# Patient Record
Sex: FEMALE | Race: AMERICAN INDIAN OR ALASKA NATIVE | Employment: UNEMPLOYED | URBAN - METROPOLITAN AREA
[De-identification: names, ages, dates, MRNs, and addresses within clinical notes are randomized per-mention and may not be internally consistent; named-entity substitution may affect disease eponyms.]

---

## 2017-02-22 ENCOUNTER — OFFICE VISIT (OUTPATIENT)
Dept: FAMILY MEDICINE | Facility: CLINIC | Age: 22
End: 2017-02-22
Payer: COMMERCIAL

## 2017-02-22 VITALS
HEART RATE: 65 BPM | BODY MASS INDEX: 43.03 KG/M2 | HEIGHT: 58 IN | SYSTOLIC BLOOD PRESSURE: 108 MMHG | TEMPERATURE: 97.8 F | DIASTOLIC BLOOD PRESSURE: 69 MMHG | WEIGHT: 205 LBS

## 2017-02-22 DIAGNOSIS — Z30.011 ENCOUNTER FOR INITIAL PRESCRIPTION OF CONTRACEPTIVE PILLS: Primary | ICD-10-CM

## 2017-02-22 DIAGNOSIS — Z11.3 SCREEN FOR STD (SEXUALLY TRANSMITTED DISEASE): ICD-10-CM

## 2017-02-22 PROCEDURE — 99213 OFFICE O/P EST LOW 20 MIN: CPT | Performed by: PHYSICIAN ASSISTANT

## 2017-02-22 RX ORDER — LEVONORGESTREL AND ETHINYL ESTRADIOL 0.15-0.03
1 KIT ORAL DAILY
Qty: 84 TABLET | Refills: 3 | Status: SHIPPED | OUTPATIENT
Start: 2017-02-22 | End: 2019-02-17

## 2017-02-22 RX ORDER — LEVONORGESTREL AND ETHINYL ESTRADIOL 0.15-0.03
1 KIT ORAL DAILY
COMMUNITY
End: 2017-02-22

## 2017-02-22 NOTE — MR AVS SNAPSHOT
"              After Visit Summary   2017    Angie Reynolds    MRN: [id number]           Patient Information     Date Of Birth          1995        Visit Information        Provider Department      2017 9:20 AM Cristela Lynn PA-C [facility]        Today's Diagnoses     Encounter for initial prescription of contraceptive pills    -  1    Screen for STD (sexually transmitted disease)           Follow-ups after your visit        Who to contact     If you have questions or need follow up information about today's clinic visit or your schedule please contact [facility] directly at [phone].  Normal or non-critical lab and imaging results will be communicated to you by MyChart, letter or phone within 4 business days after the clinic has received the results. If you do not hear from us within 7 days, please contact the clinic through [facility] or phone. If you have a critical or abnormal lab result, we will notify you by phone as soon as possible.  Submit refill requests through [employer] or call your pharmacy and they will forward the refill request to us. Please allow 3 business days for your refill to be completed.          Additional Information About Your Visit        MyChart Information     [employer] lets you send messages to your doctor, view your test results, renew your prescriptions, schedule appointments and more. To sign up, go to [email or web address] . Click on \"Log in\" on the left side of the screen, which will take you to the Welcome page. Then click on \"Sign up Now\" on the right side of the page.     You will be asked to enter the access code listed below, as well as some personal information. Please follow the directions to create your username and password.     Your access code is: [email or web address]  Expires: 2017 10:02 AM     Your access code will  in 90 days. If you need help or a new code, please call your [place] clinic " "or [phone].        Care EveryWhere ID     This is your Care EveryWhere ID. This could be used by other organizations to access your [place] medical records  [id number]        Your Vitals Were     Pulse Temperature Height Last Period Breastfeeding? BMI (Body Mass Index)    65 97.8  F (36.6  C) (Oral) 4' 10.43\" (1.484 m) 01/30/2017 No 42.22 kg/m2       Blood Pressure from Last 3 Encounters:   02/22/17 108/69   10/21/10 (!) 89/53    Weight from Last 3 Encounters:   02/22/17 205 lb (93 kg) (98 %)*   10/21/10 143 lb (64.9 kg) (92 %)*   09/13/10 146 lb (66.2 kg) (94 %)*     * Growth percentiles are based on [facility] 2-20 Years data.              We Performed the Following     Chlamydia trachomatis PCR     Neisseria gonorrhoeae PCR          Where to get your medicines      These medications were sent to [place] [id number] [facility] [place] - [address]  [address] [place] [address]     Phone:  [phone]     levonorgestrel-ethinyl estradiol 0.15-30 MG-MCG per tablet          Primary Care Provider Office Phone # Fax #    Barrington Sotelo [phone]       [facility] [address]  [place], [place]. \Hospital Has a New Name and Outlook.\"" [id number]        Thank you!     Thank you for choosing [facility]  for your care. Our goal is always to provide you with excellent care. Hearing back from our patients is one way we can continue to improve our services. Please take a few minutes to complete the written survey that you may receive in the mail after your visit with us. Thank you!             Your Updated Medication List - Protect others around you: Learn how to safely use, store and throw away your medicines at www.disposemymeds.org.          This list is accurate as of: 2/22/17 10:02 AM.  Always use your most recent med list.                   Brand Name Dispense Instructions for use    levonorgestrel-ethinyl estradiol 0.15-30 MG-MCG per tablet    AGUSTO    84 tablet    Take 1 tablet by mouth " daily

## 2017-02-22 NOTE — PROGRESS NOTES
"  SUBJECTIVE:                                                    Angie Reynolds is a 21 year old female who presents to clinic today for the following health issues:      Medication Followup of Birth Control     Taking Medication as prescribed: yes    Side Effects:  None    Medication Helping Symptoms:  yes   Was not being seen due to insurance  Started her moms birth control who didn't need it   Likes this.  Had never been on it before.  Wants it due to being SA.  No problems taking it.    No family hx of clotting disorder         Problem list and histories reviewed & adjusted, as indicated.  Additional history: as documented    There is no problem list on file for this patient.    Past Surgical History   Procedure Laterality Date     Pe tubes         Social History   Substance Use Topics     Smoking status: Never Smoker     Smokeless tobacco: Not on file     Alcohol use No     Family History   Problem Relation Age of Onset     HEART DISEASE Maternal Grandfather            ROS:  As above    OBJECTIVE:                                                    /69 (BP Location: Left arm, Patient Position: Chair, Cuff Size: Adult Large)  Pulse 65  Temp 97.8  F (36.6  C) (Oral)  Ht 4' 10.43\" (1.484 m)  Wt 205 lb (93 kg)  LMP 01/30/2017  Breastfeeding? No  BMI 42.22 kg/m2  Body mass index is 42.22 kg/(m^2).  GENERAL: alert, no distress and obese  RESP: lungs clear to auscultation - no rales, rhonchi or wheezes  CV: regular rates and rhythm, normal S1 S2, no S3 or S4 and no murmur, click or rub    Diagnostic Test Results:  none      ASSESSMENT/PLAN:                                                      1. Encounter for initial prescription of contraceptive pills  Continue.  Discussed clotting risk.  Encouraged condom use still  - levonorgestrel-ethinyl estradiol (NORDETTE) 0.15-30 MG-MCG per tablet; Take 1 tablet by mouth daily  Dispense: 84 tablet; Refill: 3  - Chlamydia trachomatis PCR; Future  - Neisseria " gonorrhoeae PCR; Future    2. Screen for STD (sexually transmitted disease)    - Chlamydia trachomatis PCR; Future  - Neisseria gonorrhoeae PCR; Future    FUTURE APPOINTMENTS:       - Follow-up for annual visit or as needed    Cristela Lynn PA-C  [facility]

## 2018-09-03 ENCOUNTER — HEALTH MAINTENANCE LETTER (OUTPATIENT)
Age: 23
End: 2018-09-03

## 2019-01-12 DIAGNOSIS — Z30.011 ENCOUNTER FOR INITIAL PRESCRIPTION OF CONTRACEPTIVE PILLS: ICD-10-CM

## 2019-01-14 RX ORDER — LEVONORGESTREL AND ETHINYL ESTRADIOL 0.15-0.03
KIT ORAL
Qty: 28 TABLET | Refills: 0 | OUTPATIENT
Start: 2019-01-14

## 2019-01-14 NOTE — TELEPHONE ENCOUNTER
Attempt # 1  Called patient at home [phone]  Was call answered?  No answer, left message to call nurse line at [phone]    Brisa Cruz RN  [facility]

## 2019-01-14 NOTE — TELEPHONE ENCOUNTER
"Requested Prescriptions   Pending Prescriptions Disp Refills     MARLISSA 0.15-30 MG-MCG tablet [Pharmacy Med Name: MARLISSA-28 TABLET] 84 tablet 2     Sig: TAKE 1 TABLET BY MOUTH ONCE DAILY.    Contraceptives Protocol Failed - 1/12/2019  1:35 PM       Failed - Recent (12 mo) or future (30 days) visit within the authorizing provider's specialty    Patient had office visit in the last 12 months or has a visit in the next 30 days with authorizing provider or within the authorizing provider's specialty.  See \"Patient Info\" tab in inbasket, or \"Choose Columns\" in Meds & Orders section of the refill encounter.             Passed - Patient is not a current smoker if age is 35 or older       Passed - Medication is active on med list       Passed - No active pregnancy on record       Passed - No positive pregnancy test in past 12 months        Last Rx was sent 2/22/17 for a year, should have run out almost a year ago.    Last visit was 2/22/17.    Routing refill request to provider for review/approval because:  A break in medication  Patient needs to be seen because it has been more than 1 year since last office visit.    Deneen Palm RN  [facility]            "

## 2019-01-16 NOTE — TELEPHONE ENCOUNTER
Attempt # 1  Called patient at home number.  Was call answered?  Yes, when nurse identified self and clinic patient hung up, nurse called right back and it went to voice mail, nurse left message: we received a refill request for birth control and will not be filled without an office visit. May call [phone] to schedule appointment.    Closing TE.     Brisa Cruz RN  [facility]

## 2019-01-30 DIAGNOSIS — Z30.011 ENCOUNTER FOR INITIAL PRESCRIPTION OF CONTRACEPTIVE PILLS: ICD-10-CM

## 2019-01-30 NOTE — TELEPHONE ENCOUNTER
"Requested Prescriptions   Pending Prescriptions Disp Refills     MARLISSA 0.15-30 MG-MCG tablet [Pharmacy Med Name: MARLISSA-28 TABLET] 84 tablet 2    Last Written Prescription Date:  2-22-17  Last Fill Quantity: 84,  # refills: 3   Last office visit: 2/22/2017 with prescribing provider:  2-22-17   Future Office Visit:     Sig: TAKE 1 TABLET BY MOUTH ONCE DAILY.    Contraceptives Protocol Failed - 1/30/2019 12:09 PM       Failed - Recent (12 mo) or future (30 days) visit within the authorizing provider's specialty    Patient had office visit in the last 12 months or has a visit in the next 30 days with authorizing provider or within the authorizing provider's specialty.  See \"Patient Info\" tab in inbasket, or \"Choose Columns\" in Meds & Orders section of the refill encounter.             Passed - Patient is not a current smoker if age is 35 or older       Passed - Medication is active on med list       Passed - No active pregnancy on record       Passed - No positive pregnancy test in past 12 months          "

## 2019-01-31 RX ORDER — LEVONORGESTREL AND ETHINYL ESTRADIOL 0.15-0.03
KIT ORAL
Qty: 84 TABLET | Refills: 2 | OUTPATIENT
Start: 2019-01-31

## 2019-01-31 NOTE — TELEPHONE ENCOUNTER
Patient needs an appointment for refills. Message left for patient see other TE. Note to pharmacy that medication will not be refilled without an appointment.     Laura Aguilar RN

## 2019-02-17 DIAGNOSIS — Z30.011 ENCOUNTER FOR INITIAL PRESCRIPTION OF CONTRACEPTIVE PILLS: ICD-10-CM

## 2019-02-18 RX ORDER — LEVONORGESTREL AND ETHINYL ESTRADIOL 0.15-0.03
KIT ORAL
Qty: 84 TABLET | Refills: 2 | Status: SHIPPED | OUTPATIENT
Start: 2019-02-18 | End: 2021-12-08

## 2019-02-18 NOTE — TELEPHONE ENCOUNTER
"Requested Prescriptions   Pending Prescriptions Disp Refills     MARLISSA 0.15-30 MG-MCG tablet [Pharmacy Med Name: MARLISSA-28 TABLET] 84 tablet 2    Last Written Prescription Date:  2/22/17  Last Fill Quantity: 84,  # refills: 3   Last office visit: 2/22/2017 with prescribing provider:     Future Office Visit:     Sig: TAKE 1 TABLET BY MOUTH ONCE DAILY.    Contraceptives Protocol Failed - 2/17/2019  2:03 PM       Failed - Recent (12 mo) or future (30 days) visit within the authorizing provider's specialty    Patient had office visit in the last 12 months or has a visit in the next 30 days with authorizing provider or within the authorizing provider's specialty.  See \"Patient Info\" tab in inbasket, or \"Choose Columns\" in Meds & Orders section of the refill encounter.             Passed - Patient is not a current smoker if age is 35 or older       Passed - Medication is active on med list       Passed - No active pregnancy on record       Passed - No positive pregnancy test in past 12 months          "

## 2019-02-18 NOTE — TELEPHONE ENCOUNTER
Multiple attempts to reach patient by phone.  Last note states she hung up on caller.    Patient has not been seen since 2/2017.    Should refill request be denied?    Please review.  Thank you.  Anna Marie Leblanc RN

## 2019-02-25 ENCOUNTER — OFFICE VISIT (OUTPATIENT)
Dept: FAMILY MEDICINE | Facility: CLINIC | Age: 24
End: 2019-02-25
Payer: COMMERCIAL

## 2019-02-25 VITALS
TEMPERATURE: 98.5 F | DIASTOLIC BLOOD PRESSURE: 63 MMHG | WEIGHT: 170 LBS | HEIGHT: 59 IN | BODY MASS INDEX: 34.27 KG/M2 | OXYGEN SATURATION: 97 % | SYSTOLIC BLOOD PRESSURE: 102 MMHG | HEART RATE: 56 BPM

## 2019-02-25 DIAGNOSIS — Z48.02 VISIT FOR SUTURE REMOVAL: ICD-10-CM

## 2019-02-25 DIAGNOSIS — S61.311D LACERATION OF LEFT INDEX FINGER WITHOUT FOREIGN BODY WITH DAMAGE TO NAIL, SUBSEQUENT ENCOUNTER: Primary | ICD-10-CM

## 2019-02-25 PROCEDURE — 99203 OFFICE O/P NEW LOW 30 MIN: CPT | Performed by: FAMILY MEDICINE

## 2019-02-25 ASSESSMENT — MIFFLIN-ST. JEOR: SCORE: 1440.73

## 2019-02-25 NOTE — PROGRESS NOTES
"  SUBJECTIVE:   Angie Reynolds is a 23 year old female who presents to clinic today for the following health issues:      ED/UC Followup:    Facility:  [facility]  Date of visit: 2/16/19  Reason for visit: Laceration of left index finger  Current Status: Here for suture removal       She suffered a near avulsion laceration to the tip of her left index finger.  She cut it on a  at work.  She works at a local [employer].  She went into the ER and had 4 sutures placed.  There apparently was no bony injury.  She is here for suture removal now.  She has not been cleaning the wound at all.  She has kept it bandaged.    Problem list and histories reviewed & adjusted, as indicated.  Additional history: as documented    There is no problem list on file for this patient.    Past Surgical History:   Procedure Laterality Date     PE TUBES         Social History     Tobacco Use     Smoking status: Never Smoker     Smokeless tobacco: Never Used   Substance Use Topics     Alcohol use: Yes     Family History   Problem Relation Age of Onset     Heart Disease Maternal Grandfather          Current Outpatient Medications   Medication Sig Dispense Refill     MARLISSA 0.15-30 MG-MCG tablet TAKE 1 TABLET BY MOUTH ONCE DAILY. 84 tablet 2     Allergies   Allergen Reactions     Penicillins Hives       Reviewed and updated as needed this visit by clinical staff  Tobacco  Allergies  Meds  Med Hx  Surg Hx  Fam Hx  Soc Hx      Reviewed and updated as needed this visit by Provider         ROS:  Constitutional, HEENT, cardiovascular, pulmonary, gi and gu systems are negative, except as otherwise noted.    OBJECTIVE:     /63 (BP Location: Right arm, Patient Position: Chair, Cuff Size: Adult Regular)   Pulse 56   Temp 98.5  F (36.9  C) (Oral)   Ht 1.497 m (4' 10.94\")   Wt 77.1 kg (170 lb)   SpO2 97%   BMI 34.41 kg/m    Body mass index is 34.41 kg/m .  GENERAL: alert, no distress and over weight  SKIN: The tip of the left finger " has a healing injury that does not look infected, but she has cotton gauze bandage impregnated into the skin of the tip of her left finger and the skin and bandages very irregular and covered with scab and it is difficult to see the sutures.  I tried picking at the wound initially with a forceps and suture scissors, but it was causing the patient a lot of pain and she was crying.  We then had her soak the finger for a prolonged time and then I proceeded to try to pick out some of the cotton gauze dressing that was sticking to her wound in order to get to the suture material.  This was again very painful for the patient and caused some fresh bleeding.  The sutures were difficult to see.  The tip of her finger nail was avulsed and was sticking to some of the wound and I was able to remove that.  I eventually was able to remove at least 3 sutures.  I continued to lightly and carefully debride the wound and re-pick away at the adherent bandage material.  I was not able to see any further suture material.    The wound was then further cleansed and bandaged.      Diagnostic Test Results:  Her ER note was reviewed    ASSESSMENT/PLAN:       ICD-10-CM    1. Laceration of left index finger without foreign body with damage to nail, subsequent encounter S61.311D    2. Visit for suture removal Z48.02      This was an extremely difficult suture removal and I am still not convinced that I got all the suture removed, but we were reaching the limit of what she could tolerate  She will continue to soak the finger and look at daily for any further suture material  We also will set her up to see one of our female providers later this week for a general physical including Pap smear  I alerted the female provider, Subha Sierra, who the patient will be seeing, of the patient and the request to recheck her fingertip on Friday when she returns for her physical  The patient could return to see me in the meantime for any further concerns about  this injury as needed/desired as well    25 minute visit with over half the time spent in complex wound care and debridement and suture removal      Barrington Sotelo MD  [facility]

## 2019-03-01 ENCOUNTER — OFFICE VISIT (OUTPATIENT)
Dept: FAMILY MEDICINE | Facility: CLINIC | Age: 24
End: 2019-03-01
Payer: MEDICAID

## 2019-03-01 VITALS
OXYGEN SATURATION: 98 % | WEIGHT: 171 LBS | HEART RATE: 56 BPM | SYSTOLIC BLOOD PRESSURE: 103 MMHG | DIASTOLIC BLOOD PRESSURE: 49 MMHG | TEMPERATURE: 97.6 F | BODY MASS INDEX: 34.61 KG/M2

## 2019-03-01 DIAGNOSIS — Z12.4 CERVICAL CANCER SCREENING: ICD-10-CM

## 2019-03-01 DIAGNOSIS — Z23 NEED FOR HPV VACCINE: ICD-10-CM

## 2019-03-01 DIAGNOSIS — Z00.01 ENCOUNTER FOR PREVENTATIVE ADULT HEALTH CARE EXAM WITH ABNORMAL FINDINGS: Primary | ICD-10-CM

## 2019-03-01 DIAGNOSIS — R06.89 DIFFICULTY BREATHING: ICD-10-CM

## 2019-03-01 DIAGNOSIS — Z00.00 HEALTHCARE MAINTENANCE: Primary | ICD-10-CM

## 2019-03-01 DIAGNOSIS — Z00.00 HEALTHCARE MAINTENANCE: ICD-10-CM

## 2019-03-01 DIAGNOSIS — T14.8XXA AVULSION OF SKIN: ICD-10-CM

## 2019-03-01 PROCEDURE — 99213 OFFICE O/P EST LOW 20 MIN: CPT | Mod: 25 | Performed by: NURSE PRACTITIONER

## 2019-03-01 PROCEDURE — 87591 N.GONORRHOEAE DNA AMP PROB: CPT | Performed by: NURSE PRACTITIONER

## 2019-03-01 PROCEDURE — 90471 IMMUNIZATION ADMIN: CPT | Performed by: NURSE PRACTITIONER

## 2019-03-01 PROCEDURE — 99395 PREV VISIT EST AGE 18-39: CPT | Mod: 25 | Performed by: NURSE PRACTITIONER

## 2019-03-01 PROCEDURE — 90649 4VHPV VACCINE 3 DOSE IM: CPT | Performed by: NURSE PRACTITIONER

## 2019-03-01 PROCEDURE — 87491 CHLMYD TRACH DNA AMP PROBE: CPT | Performed by: NURSE PRACTITIONER

## 2019-03-01 PROCEDURE — G0145 SCR C/V CYTO,THINLAYER,RESCR: HCPCS | Performed by: NURSE PRACTITIONER

## 2019-03-01 ASSESSMENT — ENCOUNTER SYMPTOMS
NERVOUS/ANXIOUS: 0
HEADACHES: 0
FEVER: 0
JOINT SWELLING: 0
DYSURIA: 0
PARESTHESIAS: 0
SORE THROAT: 0
FREQUENCY: 0
MYALGIAS: 0
SHORTNESS OF BREATH: 0
CHILLS: 0
COUGH: 0
EYE PAIN: 0
BREAST MASS: 0
ARTHRALGIAS: 0
HEMATURIA: 0
WEAKNESS: 0
NAUSEA: 0
HEARTBURN: 1
ABDOMINAL PAIN: 0
PALPITATIONS: 0
HEMATOCHEZIA: 0
DIZZINESS: 0
DIARRHEA: 0
CONSTIPATION: 0

## 2019-03-01 ASSESSMENT — PAIN SCALES - GENERAL: PAINLEVEL: NO PAIN (0)

## 2019-03-01 NOTE — LETTER
[address] [address]  Phone: [phone]  Fax: [phone]    March 1, 2019        Angie Reynolds  [address] [place] [address]          To whom it may concern:    RE: Angie Reynolds    Patient was seen on 2/25/19 for suture removal.    Please contact me for questions or concerns.      Sincerely,        CARA Acosta CNP

## 2019-03-01 NOTE — LETTER
March 11, 2019      Angie M Rodolfo  [address] [place] [id number]    Dear MsKhangRodolfo,      I am happy to inform you that your recent cervical cancer screening test (PAP smear) was normal.      Preventative screenings such as this help to ensure your health for years to come. You should repeat a pap smear in 3 years, unless otherwise directed.      You will still need to return to the clinic every year for your annual exam and other preventive tests.     If you have additional questions regarding this result, please call our registered nurse, Mónica at [phone].      Sincerely,      CARA Acosta CNP/rlm

## 2019-03-01 NOTE — NURSING NOTE
Screening Questionnaire for Adult Immunization    1. Are you sick today? No  2. Do  you have allergies to medications, food, a vaccine component, or latex? No   3. Have you had a serious reaction to a vaccine in the past? No   4. Do you have a long-term  health problem with lung, heart, kidney or metabolic disease(e.g., diabetes), asthma, or a blood disorder?  No  5.Do you have cancer,leukemia,HIV/AIDS, or any other immune system problem ? No  6. In the past 3 months, have you taken medications that weaken your immune sytem,      Such as cortisone, prednisone, other steroids, or anticancer drugs, or have you had radiation treatments ?No  7. Have you had a seizure or brain or other nervous system problems? No  8. During the past year, have you received a transfusion of blood or blood products, or been given immune      (gamma) globulin or an antiviral drug? No  9. For women: Are you pregnant or is there a chance you could become pregnant during the next month?No   10. Have you received any vaccinations in the past 4 weeks ? No    Immunization questionnaire answers were all negative.     Screening performed by patient/cm  VIS for HPV given on same date of administration.  Staff signature/Title: BONG Mercado MA  Prior to injection, verified patient identity using patient's name and date of birth.  Due to injection administration, patient instructed to remain in clinic for 15 minutes  afterwards, and to report any adverse reaction to me immediately.    HPV    Drug Amount Wasted:  None.  Vial/Syringe: Single dose vial  Expiration Date:  10/10/20

## 2019-03-01 NOTE — LETTER
[facility]   [address] [place]  [address]  [phone]                                   March 4, 2019    Angie Reynolds  [address]  [facility] [address]        Dear Angie,    The results of your recent labs are enclosed.   Testing for gonorrhea and chlamydia was negative.   The blood work was not done. Please schedule a lab only appointment some time in the next few weeks for labs.   It was nice meeting you in clinic. Please let me know if you have any concerns.     Results for orders placed or performed in visit on 03/01/19   NEISSERIA GONORRHOEA PCR   Result Value Ref Range    Specimen Descrip Cervical     N Gonorrhea PCR Negative NEG^Negative   CHLAMYDIA TRACHOMATIS PCR   Result Value Ref Range    Specimen Description Cervical     Chlamydia Trachomatis PCR Negative NEG^Negative       If you have any questions please call the clinic at [phone]    Sincerely,    Subha Sierra CNP  bmd

## 2019-03-01 NOTE — PROGRESS NOTES
SUBJECTIVE:   CC: Angie Reynolds is an 23 year old woman who presents for preventive health visit.     Physical   Annual:     Getting at least 3 servings of Calcium per day:  Yes    Bi-annual eye exam:  Yes    Dental care twice a year:  Yes    Sleep apnea or symptoms of sleep apnea:  None    Diet:  Regular (no restrictions)    Frequency of exercise:  1 day/week    Duration of exercise:  Less than 15 minutes    Taking medications regularly:  Yes    Medication side effects:  None    Additional concerns today:  No    PHQ-2 Total Score: 0    She is on COCs  2 partners in past year  Agreeable to STD testing  Regular periods  Smokes 2-3 cigarettes/day  Started recently  Work is stressful  Works at [employer]    She saw Dr. Sotelo in clinic 4 days ago for f/u of finger tip avulsion. The dressing was adhered to the wound and suture removal was difficult.   Since then she has been cleansing daily, applying topical antibiotic and keep covered  No drainage, increase in pain, fever or chills    She asks for referral to ENT  Difficulty breathing through her nose for years  Constant  Mouth breather  Does not snore  No history of injury  No nasal drainage or signs of infection    Today's PHQ-2 Score:   PHQ-2 ( 1999 [employer]) 3/1/2019   Q1: Little interest or pleasure in doing things 0   Q2: Feeling down, depressed or hopeless 0   PHQ-2 Score 0   Q1: Little interest or pleasure in doing things Not at all   Q2: Feeling down, depressed or hopeless Not at all   PHQ-2 Score 0       Abuse: Current or Past(Physical, Sexual or Emotional)- No  Do you feel safe in your environment? Yes    Social History     Tobacco Use     Smoking status: Never Smoker     Smokeless tobacco: Never Used   Substance Use Topics     Alcohol use: Yes     Alcohol Use 3/1/2019   If you drink alcohol do you typically have greater than 3 drinks per day OR greater than 7 drinks per week? No       Reviewed orders with patient.  Reviewed health maintenance and  updated orders accordingly - Yes  Labs reviewed in EPIC  BP Readings from Last 3 Encounters:   03/01/19 103/49   02/25/19 102/63   02/22/17 108/69    Wt Readings from Last 3 Encounters:   03/01/19 77.6 kg (171 lb)   02/25/19 77.1 kg (170 lb)   02/22/17 93 kg (205 lb) (98 %)*     * Growth percentiles are based on CDC (Girls, 2-20 Years) data.                  There is no problem list on file for this patient.    Past Surgical History:   Procedure Laterality Date     PE TUBES         Social History     Tobacco Use     Smoking status: Never Smoker     Smokeless tobacco: Never Used   Substance Use Topics     Alcohol use: Yes     Family History   Problem Relation Age of Onset     Asthma Mother      Heart Disease Maternal Grandfather      Asthma Brother      Depression Sister         post partum     Depression Sister         post partum         Current Outpatient Medications   Medication Sig Dispense Refill     MARLISSA 0.15-30 MG-MCG tablet TAKE 1 TABLET BY MOUTH ONCE DAILY. 84 tablet 2       Mammogram not appropriate for this patient based on age.    Pertinent mammograms are reviewed under the imaging tab.  History of abnormal Pap smear: NO - age 21-29 PAP every 3 years recommended     Reviewed and updated as needed this visit by clinical staff         Reviewed and updated as needed this visit by Provider        No past medical history on file.     Review of Systems   Constitutional: Negative for chills and fever.   HENT: Negative for congestion, ear pain, hearing loss and sore throat.    Eyes: Negative for pain and visual disturbance.   Respiratory: Negative for cough and shortness of breath.    Cardiovascular: Negative for palpitations and peripheral edema.   Gastrointestinal: Negative for abdominal pain, constipation, diarrhea, hematochezia and nausea.   Breasts:  Negative for tenderness, breast mass and discharge.   Genitourinary: Positive for vaginal discharge. Negative for dysuria, frequency, genital sores,  hematuria, pelvic pain, urgency and vaginal bleeding.   Musculoskeletal: Negative for arthralgias, joint swelling and myalgias.   Skin: Negative for rash.   Neurological: Negative for dizziness, weakness, headaches and paresthesias.   Psychiatric/Behavioral: Negative for mood changes. The patient is not nervous/anxious.           OBJECTIVE:   /49 (BP Location: Right arm, Patient Position: Chair, Cuff Size: Adult Regular)   Pulse 56   Temp 97.6  F (36.4  C) (Oral)   Wt 77.6 kg (171 lb)   LMP 02/20/2019   SpO2 98%   Breastfeeding? No   BMI 34.61 kg/m    Physical Exam  GENERAL: healthy, alert and no distress  EYES: Eyes grossly normal to inspection, PERRL and conjunctivae and sclerae normal  HENT: ear canals and TM's normal, nose and mouth without ulcers or lesions  NECK: no adenopathy, no asymmetry, masses, or scars and thyroid normal to palpation  RESP: lungs clear to auscultation - no rales, rhonchi or wheezes  CV: regular rate and rhythm, normal S1 S2, no S3 or S4, no murmur, click or rub, no peripheral edema and peripheral pulses strong  ABDOMEN: soft, nontender, no hepatosplenomegaly, no masses and bowel sounds normal   (female): normal female external genitalia, normal urethral meatus, vaginal mucosa pink, moist, well rugated, and normal cervix  Pelvic exam done with Yasmeen Mercado MA present  MS: no gross musculoskeletal defects noted, no edema  SKIN: partial thickness avulsion to left index finger. I do not see any sutures. No surrounding erythema or drainage.   NEURO: Normal strength and tone, mentation intact and speech normal  PSYCH: mentation appears normal, affect normal/bright    Diagnostic Test Results:  none     ASSESSMENT/PLAN:       ICD-10-CM    1. Encounter for preventative adult health care exam with abnormal findings Z00.01    2. Difficulty breathing R06.89 OTOLARYNGOLOGY REFERRAL   3. Healthcare maintenance Z00.00 NEISSERIA GONORRHOEA PCR     CHLAMYDIA TRACHOMATIS PCR      "CANCELED: CBC with platelets     CANCELED: Basic metabolic panel     CANCELED: TSH with free T4 reflex     CANCELED: HIV Antigen Antibody Combo     CANCELED: Treponema Abs w Reflex to RPR and Titer   4. Cervical cancer screening Z12.4 Pap imaged thin layer screen only - recommended age 21 - 24 years   5. Need for HPV vaccine Z23 VACCINE ADMINISTRATION, INITIAL   6. Avulsion of skin T14.8XXA      Strongly encouraged smoking cessation. She is only smoking a few cigarettes daily and advised that now will be the easiest time in her life to quit  Reviewed risks of tobacco use  STD screening and baseline labs  Reviewed indication for HPV vaccine which she is agreeable to  Skin injury appears to be healing. No signs of infection. Reviewed continued wound cares and S/S to watch for and when to return to clinic  Discussed risks of COCs including blood clot and increased risk with smoking    COUNSELING:  Reviewed preventive health counseling, as reflected in patient instructions       Regular exercise       Healthy diet/nutrition       Contraception       Safe sex practices/STD prevention    BP Readings from Last 1 Encounters:   02/25/19 102/63     Estimated body mass index is 34.41 kg/m  as calculated from the following:    Height as of 2/25/19: 1.497 m (4' 10.94\").    Weight as of 2/25/19: 77.1 kg (170 lb).           reports that  has never smoked. she has never used smokeless tobacco.      Counseling Resources:  ATP IV Guidelines  Pooled Cohorts Equation Calculator  Breast Cancer Risk Calculator  FRAX Risk Assessment  ICSI Preventive Guidelines  Dietary Guidelines for Americans, 2010  USDA's MyPlate  ASA Prophylaxis  Lung CA Screening    CARA Acosta CNP  [facility]  "

## 2019-03-03 LAB
C TRACH DNA SPEC QL NAA+PROBE: NEGATIVE
N GONORRHOEA DNA SPEC QL NAA+PROBE: NEGATIVE
SPECIMEN SOURCE: NORMAL
SPECIMEN SOURCE: NORMAL

## 2019-03-04 NOTE — RESULT ENCOUNTER NOTE
[address] [address]  Phone: [phone]  Fax: [phone]      03/04/19    Angie Reynolds  [address] [place] [id number]      Dear Angie,    The results of your recent labs are enclosed.  Testing for gonorrhea and chlamydia was negative.  The blood work was not done. Please schedule a lab only appointment some time in the next few weeks for labs.   It was nice meeting you in clinic. Please let me know if you have any concerns.     Sincerely,    CARA Acosta CNP    Your [facility] Care Team

## 2019-03-06 LAB
COPATH REPORT: NORMAL
PAP: NORMAL

## 2019-09-23 ENCOUNTER — OFFICE VISIT (OUTPATIENT)
Dept: FAMILY MEDICINE | Facility: CLINIC | Age: 24
End: 2019-09-23
Payer: COMMERCIAL

## 2019-09-23 VITALS
HEART RATE: 76 BPM | BODY MASS INDEX: 35.42 KG/M2 | TEMPERATURE: 98.4 F | SYSTOLIC BLOOD PRESSURE: 99 MMHG | DIASTOLIC BLOOD PRESSURE: 62 MMHG | WEIGHT: 175 LBS | OXYGEN SATURATION: 98 %

## 2019-09-23 DIAGNOSIS — J06.9 UPPER RESPIRATORY TRACT INFECTION, UNSPECIFIED TYPE: Primary | ICD-10-CM

## 2019-09-23 PROCEDURE — 99213 OFFICE O/P EST LOW 20 MIN: CPT | Performed by: NURSE PRACTITIONER

## 2019-09-23 RX ORDER — FLUTICASONE PROPIONATE 50 MCG
2 SPRAY, SUSPENSION (ML) NASAL DAILY
Qty: 16 G | Refills: 1 | Status: SHIPPED | OUTPATIENT
Start: 2019-09-23 | End: 2019-10-03

## 2019-09-23 ASSESSMENT — PAIN SCALES - GENERAL: PAINLEVEL: NO PAIN (0)

## 2019-09-23 NOTE — PATIENT INSTRUCTIONS
Use 2 sprays of Flonase in each nostril until your symptoms resolve  Make sure you are staying well hydrated  Continue using your over the counter medications and continue with steam bath  If your symptoms worsen, or do not continue to show improvement every day, please call and let me know    Patient Education     Viral Upper Respiratory Illness (Adult)    You have a viral upper respiratory illness (URI), which is another term for the common cold. This illness is contagious during the first few days. It is spread through the air by coughing and sneezing. It may also be spread by direct contact (touching the sick person and then touching your own eyes, nose, or mouth). Frequent handwashing will decrease risk of spread. Most viral illnesses go away within 7 to 10 days with rest and simple home remedies. Sometimes the illness may last for several weeks. Antibiotics will not kill a virus, and they are generally not prescribed for this condition.  Home care    If symptoms are severe, rest at home for the first 2 to 3 days. When you resume activity, don't let yourself get too tired.    Don't smoke. If you need help stopping, talk with your healthcare provider.    Avoid being exposed to cigarette smoke (yours or others ).    You may use acetaminophen or ibuprofen to control pain and fever, unless another medicine was prescribed. If you have chronic liver or kidney disease, have ever had a stomach ulcer or gastrointestinal bleeding, or are taking blood-thinning medicines, talk with your healthcare provider before using these medicines. Aspirin should never be given to anyone under 18 years of age who is ill with a viral infection or fever. It may cause severe liver or brain damage.    Your appetite may be poor, so a light diet is fine. Stay well hydrated by drinking 6 to 8 glasses of fluids per day (water, soft drinks, juices, tea, or soup). Extra fluids will help loosen secretions in the nose and  lungs.    Over-the-counter cold medicines will not shorten the length of time you re sick, but they may be helpful for the following symptoms: cough, sore throat, and nasal and sinus congestion. If you take prescription medicines, ask your healthcare provider or pharmacist which over-the-counter medicines are safe to use. (Note: Don't use decongestants if you have high blood pressure.)  Follow-up care  Follow up with your healthcare provider, or as advised.  When to seek medical advice  Call your healthcare provider right away if any of these occur:    Cough with lots of colored sputum (mucus)    Severe headache; face, neck, or ear pain    Difficulty swallowing due to throat pain    Fever of 100.4 F (38 C) or higher, or as directed by your healthcare provider  Call 911  Call 911 if any of these occur:    Chest pain, shortness of breath, wheezing, or difficulty breathing    Coughing up blood    Very severe pain with swallowing, especially if it goes along with a muffled voice   Date Last Reviewed: 6/1/2018 2000-2018 [employer]. [address] [place] [id number]. All rights reserved. This information is not intended as a substitute for professional medical care. Always follow your healthcare professional's instructions.

## 2019-09-23 NOTE — PROGRESS NOTES
Subjective     Angie Reynolds is a 24 year old female who presents to clinic today for the following health issues:    HPI   Acute Illness   Acute illness concerns: URI  Onset: 1 1/2 weeks    Fever: no    Chills/Sweats: no    Headache (location?): YES- the first couple of days    Sinus Pressure:YES- post-nasal drainage and facial pain    Conjunctivitis:  no    Ear Pain: no    Rhinorrhea: YES    Congestion: YES- nasal and chest    Sore Throat: no     Cough: YES-productive of green sputum    Wheeze: no    Decreased Appetite: no    Nausea: no    Vomiting: no    Diarrhea:  no    Dysuria/Freq.: no    Fatigue/Achiness: YES    Sick/Strep Exposure: no     Therapies Tried and outcome: OTC cold meds, steaming, smudging( burning edmundo leaves) and vitamin c not helping.    She developed a cold approximately 10 days ago but it has not resolved  Cough mostly productive of clear sputums  Sometimes, it is a little yellow  Denies wheeze or SOB  She is sleeping well  Throat feels scratchy, but she is tolerating PO intake        There is no problem list on file for this patient.    Past Surgical History:   Procedure Laterality Date     PE TUBES         Social History     Tobacco Use     Smoking status: Never Smoker     Smokeless tobacco: Never Used   Substance Use Topics     Alcohol use: Yes     Family History   Problem Relation Age of Onset     Asthma Mother      Heart Disease Maternal Grandfather      Asthma Brother      Depression Sister         post partum     Depression Sister         post partum             Reviewed and updated as needed this visit by Provider         Review of Systems   ROS COMP: Constitutional, HEENT, cardiovascular, pulmonary, gi and gu systems are negative, except as otherwise noted.      Objective    BP 99/62 (BP Location: Right arm, Patient Position: Chair, Cuff Size: Adult Regular)   Pulse 76   Temp 98.4  F (36.9  C) (Oral)   Wt 79.4 kg (175 lb)   LMP 08/26/2019   SpO2 98%   Breastfeeding? No    BMI 35.42 kg/m    Body mass index is 35.42 kg/m .  Physical Exam   GENERAL: healthy, alert and no distress  HENT: normal cephalic/atraumatic, ear canals and TM's normal, nose and mouth without ulcers or lesions, nasal mucosa edematous , rhinorrhea clear, oropharynx clear, oral mucous membranes moist and sinuses: not tender  NECK: no adenopathy, no asymmetry, masses, or scars and thyroid normal to palpation  RESP: lungs clear to auscultation - no rales, rhonchi or wheezes  CV: regular rate and rhythm, normal S1 S2, no S3 or S4, no murmur, click or rub, no peripheral edema and peripheral pulses strong    Diagnostic Test Results:  Labs reviewed in Epic        Assessment & Plan       ICD-10-CM    1. Upper respiratory tract infection, unspecified type J06.9 fluticasone (FLONASE) 50 MCG/ACT nasal spray          Discussed likely viral cause of illness and that antibiotics are not warranted.  Reviewed recommendations for symptomatic care: rest, hydration, steam inhalation, over the counter medications as needed to treat symptoms.  If symptoms worsen or do not continue to show daily improvement, she will call into clinic and we can discuss treating with antibiotic    CARA Acosta [facility]

## 2019-10-03 ENCOUNTER — OFFICE VISIT (OUTPATIENT)
Dept: FAMILY MEDICINE | Facility: CLINIC | Age: 24
End: 2019-10-03
Payer: COMMERCIAL

## 2019-10-03 VITALS
BODY MASS INDEX: 36.6 KG/M2 | DIASTOLIC BLOOD PRESSURE: 68 MMHG | SYSTOLIC BLOOD PRESSURE: 112 MMHG | WEIGHT: 180.8 LBS | HEART RATE: 75 BPM | TEMPERATURE: 97.5 F

## 2019-10-03 DIAGNOSIS — L70.0 ACNE VULGARIS: Primary | ICD-10-CM

## 2019-10-03 PROCEDURE — 99213 OFFICE O/P EST LOW 20 MIN: CPT | Performed by: FAMILY MEDICINE

## 2019-10-03 RX ORDER — TRETINOIN 0.25 MG/G
CREAM TOPICAL AT BEDTIME
Qty: 45 G | Refills: 3 | Status: SHIPPED | OUTPATIENT
Start: 2019-10-03 | End: 2021-12-08

## 2019-10-03 NOTE — PROGRESS NOTES
Subjective     Angie Reynolds is a 24 year old female who presents to clinic today for the following health issues:    HPI      Acne on Face - Has gotten worse in the last 1-1.5 weeks     Reviewed and updated as needed this visit by Provider      multiple closed comedoes   No scarring   No acne cysts   Patient was inquiring about an oral antibiotic   Patient uses a facial wash now and now other topical meds for her acne     Current Outpatient Medications   Medication Sig Dispense Refill             MARLISSA 0.15-30 MG-MCG tablet TAKE 1 TABLET BY MOUTH ONCE DAILY. (Patient not taking: Reported on 9/23/2019) 84 tablet 2         O: /68 (BP Location: Right arm, Patient Position: Sitting, Cuff Size: Adult Large)   Pulse 75   Temp 97.5  F (36.4  C) (Oral)   Wt 82 kg (180 lb 12.8 oz)   Breastfeeding? No   BMI 36.60 kg/m      Patient is wearing make up   I can feel the closed comedoes below the surface   There is no signifcant redness or tenderness and no cysts present   It is limited to cheek areas     No adenopathy   No involvement of chest, back   Per patient a little bit in the upper arms at times       ICD-10-CM    1. Acne vulgaris L70.0 tretinoin (RETIN-A) 0.025 % external cream     Recheck 2 months if needed   If doing well, refill prescription

## 2019-10-03 NOTE — PATIENT INSTRUCTIONS
Patient Education     Controlling Adult or Adolescent Acne    You stand the best chance of controlling your acne if you follow your treatment plan. Be patient. Acne often takes months to improve, not days or weeks. Ask your healthcare provider when you can expect your skin to look better. If you don t see results by your goal date, call your healthcare provider. He or she may want to give you some other type of treatment.  Using skin care products and cosmetics  Besides following your treatment plan, take care in choosing skin care products and cosmetics. The following tips may help:    Choose gentle, oil-free soaps and facial cleansers.    Avoid harsh acne scrubs, cleansers, or astringents. These types of products can irritate your skin.    Ask your healthcare provider before buying over-the-counter acne treatments. Some of these, such as those with benzoyl peroxide or salicylic acid, can work. But they often have side effects, such as skin getting too dry with treatments that are too strong.    Read labels on makeup and moisturizers. Choose those that are water based and oil free. Look for the term noncomedogenic. It means that the product won t clog your pores.  Caring for your skin  The right skin care routine can help keep your skin healthy. To take good care of your skin, try these tips:    Gently wash your face or other affected skin twice a day with a mild cleanser. Using your fingertips, smooth the cleanser over your skin. Rinse your skin well. Pat it dry.    If your healthcare provider has approved any over-the-counter acne medicine, use as directed. Use it after you wash your skin. Apply the medicine to all areas where you get acne. Don t just treat acne you can see now. New blemishes may be in progress but not in view yet. Treat all the skin.    Don t squeeze or pick blemishes. Acne blemishes may heal on their own. But squeezing can cause scarring. Scars will remain after acne blemishes  heal.    Sponges, brushes, or other abrasive tools can irritate the skin. Avoid using them.    If you use soft sponges or cloths to apply your makeup, keep them clean.    Try not to touch your face.    If possible, avoid clothing and equipment that blocks or rubs the face.  Getting good results  Learning more about acne is the first step toward controlling this condition. Know that acne that s being treated often gets worse at first before it improves. But with proper treatment and skin care, you can manage your acne and feel better about your skin.   Date Last Reviewed: 2/1/2017 2000-2018 [employer] [address] [place] [address]. All rights reserved. This information is not intended as a substitute for professional medical care. Always follow your healthcare professional's instructions.           Patient Education     Patient Education    Aloe Vera/Aloe Barbadensis Derivatives, Beeswax, Benzyl Alcohol, Bisabolol, Butylene Glycol, Carbomers, cetearyl alcohol, Dimethicone, Distilled Water, Glycerin, Green Tea, Hydroxyethyl Cellulose, Imidazolidinyl urea , Laureth, Methylparaben, Polyethylene Glycol, Polysorbate 60, PPG-2, Propylparaben, Squalane, tetrasodium EDTA, Triethanolamine Topical solution, Citric Acid, Co-Enzyme Q-10 , Cocamidopropyl Betaine, Cocoyl monoethanolamine, Disodium cocoamphodiacetate, Distilled Water, Green Tea, Methylparaben, Panthenol, Phenoxyethanol, Polyethylene Glycol, polyquatemium, Propylparaben, Sodium Chloride, Sodium laureth sulfate, tetrasodium EDTA Topical suspension, cleanser, Tretinoin Topical gel    Tretinoin Oral capsule    Tretinoin Topical cream    Tretinoin Topical cream [Cosmetic Use]    Tretinoin Topical gel    Tretinoin Topical solution  Tretinoin Topical cream  What is this medicine?  TRETINOIN (TRET i lorri in) is a naturally occurring form of vitamin A. It is used on the skin to treat mild to moderate acne.  This medicine may be used for other  purposes; ask your health care provider or pharmacist if you have questions.  What should I tell my health care provider before I take this medicine?  They need to know if you have any of these conditions:    eczema    excessive sensitivity to the sun    sunburn    an unusual or allergic reaction to tretinoin, vitamin A, other medicines, foods, dyes, or preservatives    pregnant or trying to get pregnant    breast-feeding  How should I use this medicine?  This medicine is for external use only. Do not take by mouth. Follow the directions on the prescription label. Gently wash your face with a mild, non-medicated soap before use. Pat the skin dry. Wait 20 to 30 minutes for your skin to dry before use in order to minimize the possibility of skin irritation. Apply enough medicine to cover the affected area and rub in gently. Avoid applying this medicine to your eyes, ears, nostrils, angles of the nose, and mouth. Do not use more often than your doctor or health care professional has recommended. Using too much of this medicine may irritate or increase the irritation of your skin, and will not give faster or better results.  Talk to your pediatrician regarding the use of this medicine in children. While this drug may be prescribed for children as young as 12 years of age for selected conditions, precautions do apply.  Overdosage: If you think you have taken too much of this medicine contact a poison control center or emergency room at once.  NOTE: This medicine is only for you. Do not share this medicine with others.  What if I miss a dose?  If you miss a dose, skip that dose and continue with your regular schedule. Do not use extra doses, or use for a longer period of time than directed by your doctor or health care professional.  What may interact with this medicine?    medicines or other preparations that may dry your skin such as benzoyl peroxide or salicylic acid    medicines that increase your sensitivity to  sunlight such as tetracycline or sulfa drugs  This list may not describe all possible interactions. Give your health care provider a list of all the medicines, herbs, non-prescription drugs, or dietary supplements you use. Also tell them if you smoke, drink alcohol, or use illegal drugs. Some items may interact with your medicine.  What should I watch for while using this medicine?  Your acne may get worse initially and should then start to improve. It may take 2 to 12 weeks before you see the full effect.  Do not wash your face more than 2 or 3 times a day, unless directed by your doctor or health care professional. Do not use the following products on the same areas that you are treating with this medicine, unless otherwise directed by your doctor or health care professional: other topical agents with a strong skin drying effect such as products with a high alcohol content, astringents, spices, the peel of lime or other citrus, medicated soaps or shampoos, permanent wave solutions, electrolysis, hair removers or waxes, or any other preparations or processes that might dry or irritate your skin.  This medicine can make you more sensitive to the sun. Keep out of the sun. If you cannot avoid being in the sun, wear protective clothing and use sunscreen. Do not use sun lamps or tanning beds/booths. Avoid cold weather and wind as much as possible, and use clothing to protect you from the weather. Skin treated with this medicine may dry out or get wind burned more easily.  What side effects may I notice from receiving this medicine?  Side effects that you should report to your doctor or health care professional as soon as possible:    darkening or lightening of the treated areas    severe burning, itching, crusting, or swelling of the treated areas  Side effects that usually do not require medical attention (report to your doctor or health care professional if they continue or are bothersome):    increased sensitivity to  the sun    itching    mild stinging    red, inflamed, and irritated skin, the skin may peel after a few days  This list may not describe all possible side effects. Call your doctor for medical advice about side effects. You may report side effects to FDA at 2-852-FDA-8303.  Where should I keep my medicine?  Keep out of the reach of children.  Store below 27 degrees C (80 degrees F). Do not freeze. Protect from light. Throw away any unused medicine after the expiration date.  NOTE:This sheet is a summary. It may not cover all possible information. If you have questions about this medicine, talk to your doctor, pharmacist, or health care provider. Copyright  2016 [employer]

## 2019-11-06 ENCOUNTER — OFFICE VISIT (OUTPATIENT)
Dept: FAMILY MEDICINE | Facility: CLINIC | Age: 24
End: 2019-11-06
Payer: COMMERCIAL

## 2019-11-06 VITALS
HEART RATE: 60 BPM | HEIGHT: 59 IN | SYSTOLIC BLOOD PRESSURE: 104 MMHG | DIASTOLIC BLOOD PRESSURE: 69 MMHG | WEIGHT: 183.6 LBS | TEMPERATURE: 97.5 F | BODY MASS INDEX: 37.01 KG/M2 | OXYGEN SATURATION: 97 %

## 2019-11-06 DIAGNOSIS — K59.00 CONSTIPATION, UNSPECIFIED CONSTIPATION TYPE: Primary | ICD-10-CM

## 2019-11-06 DIAGNOSIS — K64.4 EXTERNAL HEMORRHOIDS: ICD-10-CM

## 2019-11-06 PROCEDURE — 99213 OFFICE O/P EST LOW 20 MIN: CPT | Performed by: PHYSICIAN ASSISTANT

## 2019-11-06 RX ORDER — POLYETHYLENE GLYCOL 3350 17 G/17G
1 POWDER, FOR SOLUTION ORAL DAILY
Qty: 500 G | Refills: 5 | Status: SHIPPED | OUTPATIENT
Start: 2019-11-06 | End: 2022-03-16

## 2019-11-06 ASSESSMENT — MIFFLIN-ST. JEOR: SCORE: 1497.42

## 2019-11-06 NOTE — PATIENT INSTRUCTIONS
Patient Education     Eating a High-Fiber Diet  Fiber is what gives strength and structure to plants. Most grains, beans, vegetables, and fruits contain fiber. Foods rich in fiber are often low in calories and fat, and they fill you up more. They may also reduce your risks for certain health problems. To find out the amount of fiber in canned, packaged, or frozen foods, read the Nutrition Facts label. It tells you how much fiber is in one serving.    Types of fiber and their benefits  There are two types of fiber: insoluble and soluble. They both aid digestion and help you maintain a healthy weight.    Insoluble fiber. This is found in whole grains, cereals, certain fruits and vegetables such as apple skin, corn, and carrots. Insoluble fiber may prevent constipation and reduce the risk for certain types of cancer. It is called insoluble because it does not dissolve in water.    Soluble fiber. This type of fiber is in oats, beans, and certain fruits and vegetables such as strawberries and peas. Soluble fiber can reduce cholesterol, which may help lower the risk for heart disease. It also helps control blood sugar levels.  Look for high-fiber foods  Try these foods to add fiber to your diet:    Whole-grain breads and cereals. Try to eat 6 to 8 ounces a day. Include wheat and oat bran cereals, whole-wheat muffins or toast, and corn tortillas in your meals.    Fruits. Try to eat 2 cups a day. Apples, oranges, strawberries, pears, and bananas are good sources. (Note: Fruit juice is low in fiber.)    Vegetables. Try to eat at least 2.5 cups a day. Add asparagus, carrots, broccoli, peas, and corn to your meals.    Beans. One cup of cooked lentils gives you over 15 grams of fiber. Try navy beans, lentils, and chickpeas.    Seeds. A small handful of seeds gives you about 3 grams of fiber. Try sunflower or celena seeds.  Keep track of your fiber  Keep track of how much fiber you eat. Start by reading food labels. Then eat a  variety of foods high in fiber. As you start to eat more fiber, ask your healthcare provider how much water you should be drinking to keep your digestive system working smoothly.  Aim for a certain amount of fiber in your diet each day. If you are a woman, that amount is between 25 and 28 grams per day. Men should aim for 30 to 33 grams per day. After age 50, your daily fiber needs drop to 22 grams for women and 28 grams for men.  Before you reach for the fiber supplements, think about this. Fiber is found naturally in healthy whole foods. It gives you that feeling of fullness after you eat. Taking fiber supplements or eating fiber-enriched foods will not give you this full feeling.  Your fiber intake is a good measure for the quality of your overall diet. If you are missing out on your daily amount of fiber, you may be lacking other important nutrients as well.  Date Last Reviewed: 7/1/2017 2000-2018 [employer]. [address] [place] [address]. All rights reserved. This information is not intended as a substitute for professional medical care. Always follow your healthcare professional's instructions.           Patient Education     Treating Constipation    Constipation is a common and often uncomfortable problem. Constipation means you have bowel movements fewer than 3 times per week, or strain to pass hard, dry stool. It can last a short time. Or it can be a problem that never seems to go away. The good news is that it can often be treated and controlled.  Eat more fiber  One of the best ways to help treat constipation is to increase your fiber intake. You can do this either through diet or by using fiber supplements. Fiber (in whole grains, fruits, and vegetables) adds bulk and absorbs water to soften the stool. This helps the stool pass through the colon more easily. When you increase your fiber intake, do it slowly to avoid side effects such as bloating. Also increase the amount of  water that you drink. Eating more of the following foods can add fiber to your diet.    High-fiber cereals    Whole grains, bran, and brown rice    Vegetables such as carrots, broccoli, and greens    Fresh fruits (especially apples, pears, and dried fruits like raisins and apricots)    Nuts and legumes (especially beans such as lentils, kidney beans, and lima beans)  Get physically active  Exercise helps improve the working of your colon which helps ease constipation. Try to get some physical activity every day. If you haven t been active for a while, talk to your healthcare provider before starting again.  Laxatives  Your healthcare provider may suggest an over-the-counter product to help ease your constipation. He or she may suggest the use of bulk-forming agents or laxatives. The use of laxatives, if used as directed, is common and safe. Follow directions carefully when using them. See your healthcare provider for new-onset constipation, or long-term constipation, to rule out other causes such as medicines or thyroid disease.  Date Last Reviewed: 7/1/2016 2000-2018 [employer]. [address] [place] [address], [place] [address]. All rights reserved. This information is not intended as a substitute for professional medical care. Always follow your healthcare professional's instructions.

## 2019-11-06 NOTE — PROGRESS NOTES
"Subjective     Angie Reynolds is a 24 year old female who presents to clinic today for the following health issues:    HPI   Concern - Blood in Stool  Onset: x1 week    Description:   Pt stated she has been having blood in her stool for x1 week. Pt said that every time she has a bowel movement she has blood in her stool. Pt reports discomfort before and after a bowel movement. Pt stated she is having some pressure in her lower abdominal area.    Intensity: mild, moderate, severe    Progression of Symptoms:  worsening and constant    Accompanying Signs & Symptoms:      Previous history of similar problem:   none    Precipitating factors:   Worsened by: bowel movement    Alleviating factors:  Improved by:     Therapies Tried and outcome: none      Having rectal discomfort before she goes. Has an urgency feeling related to bowel movements. However only has hard bowel movements every other day, sometimes small amounts.    There is blood in the stool and when she wipes. bright red blood no clots.   Also with abdominal pains. Only when having a bowel movement.   Stools are hard, she has to push and strain.   Stools are a green-brown color.   No nausea or vomiting.     Maternal uncle with colon cancer, 50-60 year old.   No ulcerative colitis or Crohn's disease.         Reviewed and updated as needed this visit by Provider  Tobacco  Allergies  Meds  Problems  Med Hx  Surg Hx  Fam Hx         Review of Systems   ROS COMP: Constitutional, HEENT, cardiovascular, pulmonary, gi and gu systems are negative, except as otherwise noted.      Objective    /69 (BP Location: Left arm, Patient Position: Chair, Cuff Size: Adult Large)   Pulse 60   Temp 97.5  F (36.4  C) (Oral)   Ht 1.497 m (4' 10.94\")   Wt 83.3 kg (183 lb 9.6 oz)   LMP 10/28/2019   SpO2 97%   Breastfeeding? No   BMI 37.16 kg/m    Body mass index is 37.16 kg/m .  Physical Exam   GENERAL: healthy, alert and no distress  ABDOMEN: soft, Minimally " tender in the right and lower quadrants of the stomach, no guarding or rebound tenderness, no hepatosplenomegaly, no masses and bowel sounds normal  RECTAL (female): small non bleeding, non thrombosed skin tag. Patient refused rectal exam    Diagnostic Test Results:  none         Assessment & Plan       ICD-10-CM    1. Constipation, unspecified constipation type K59.00 polyethylene glycol (MIRALAX/GLYCOLAX) powder   2. External hemorrhoids K64.4 hydrocortisone (ANUSOL-HC) 2.5 % cream   Work on improving constipation. Miralax and increased water and fiber.   Treat external hemorrhoid. If not improving in 2 weeks and still having any blood needs colonoscopy.     Return in about 2 weeks (around 11/20/2019) for follow up only as needed if symptoms worsen.    Daxa Boyle PA-C  [facility]

## 2021-01-21 NOTE — NURSING NOTE
"Chief Complaint   Patient presents with     Recheck Medication       Initial /69 (BP Location: Left arm, Patient Position: Chair, Cuff Size: Adult Large)  Pulse 65  Temp 97.8  F (36.6  C) (Oral)  Ht 4' 10.43\" (1.484 m)  Wt 205 lb (93 kg)  Breastfeeding? No  BMI 42.22 kg/m2 Estimated body mass index is 42.22 kg/(m^2) as calculated from the following:    Height as of this encounter: 4' 10.43\" (1.484 m).    Weight as of this encounter: 205 lb (93 kg).  Medication Reconciliation: complete   Johanny Aquino CMA       " No

## 2021-12-08 ENCOUNTER — OFFICE VISIT (OUTPATIENT)
Dept: PEDIATRICS | Facility: CLINIC | Age: 26
End: 2021-12-08
Payer: MEDICAID

## 2021-12-08 VITALS
BODY MASS INDEX: 39.92 KG/M2 | DIASTOLIC BLOOD PRESSURE: 72 MMHG | HEIGHT: 59 IN | TEMPERATURE: 98.6 F | HEART RATE: 56 BPM | SYSTOLIC BLOOD PRESSURE: 124 MMHG | WEIGHT: 198 LBS | OXYGEN SATURATION: 100 %

## 2021-12-08 DIAGNOSIS — Z00.00 ROUTINE GENERAL MEDICAL EXAMINATION AT A HEALTH CARE FACILITY: ICD-10-CM

## 2021-12-08 DIAGNOSIS — Z30.011 ENCOUNTER FOR INITIAL PRESCRIPTION OF CONTRACEPTIVE PILLS: ICD-10-CM

## 2021-12-08 DIAGNOSIS — Z12.4 CERVICAL CANCER SCREENING: Primary | ICD-10-CM

## 2021-12-08 DIAGNOSIS — Z11.59 NEED FOR HEPATITIS C SCREENING TEST: ICD-10-CM

## 2021-12-08 DIAGNOSIS — F43.23 ADJUSTMENT DISORDER WITH MIXED ANXIETY AND DEPRESSED MOOD: ICD-10-CM

## 2021-12-08 DIAGNOSIS — Z11.3 SCREENING FOR STDS (SEXUALLY TRANSMITTED DISEASES): ICD-10-CM

## 2021-12-08 DIAGNOSIS — Z11.4 SCREENING FOR HIV (HUMAN IMMUNODEFICIENCY VIRUS): ICD-10-CM

## 2021-12-08 DIAGNOSIS — N92.6 MISSED PERIODS: ICD-10-CM

## 2021-12-08 DIAGNOSIS — E66.01 MORBID OBESITY (H): ICD-10-CM

## 2021-12-08 DIAGNOSIS — R21 RASH: ICD-10-CM

## 2021-12-08 PROCEDURE — 81025 URINE PREGNANCY TEST: CPT | Performed by: INTERNAL MEDICINE

## 2021-12-08 PROCEDURE — 87389 HIV-1 AG W/HIV-1&-2 AB AG IA: CPT | Performed by: INTERNAL MEDICINE

## 2021-12-08 PROCEDURE — 99395 PREV VISIT EST AGE 18-39: CPT | Performed by: INTERNAL MEDICINE

## 2021-12-08 PROCEDURE — 87591 N.GONORRHOEAE DNA AMP PROB: CPT | Performed by: INTERNAL MEDICINE

## 2021-12-08 PROCEDURE — 84146 ASSAY OF PROLACTIN: CPT | Performed by: INTERNAL MEDICINE

## 2021-12-08 PROCEDURE — 84443 ASSAY THYROID STIM HORMONE: CPT | Performed by: INTERNAL MEDICINE

## 2021-12-08 PROCEDURE — 87491 CHLMYD TRACH DNA AMP PROBE: CPT | Performed by: INTERNAL MEDICINE

## 2021-12-08 PROCEDURE — 83001 ASSAY OF GONADOTROPIN (FSH): CPT | Performed by: INTERNAL MEDICINE

## 2021-12-08 PROCEDURE — 86803 HEPATITIS C AB TEST: CPT | Performed by: INTERNAL MEDICINE

## 2021-12-08 PROCEDURE — 99213 OFFICE O/P EST LOW 20 MIN: CPT | Mod: 25 | Performed by: INTERNAL MEDICINE

## 2021-12-08 PROCEDURE — 36415 COLL VENOUS BLD VENIPUNCTURE: CPT | Performed by: INTERNAL MEDICINE

## 2021-12-08 PROCEDURE — G0123 SCREEN CERV/VAG THIN LAYER: HCPCS | Performed by: INTERNAL MEDICINE

## 2021-12-08 RX ORDER — KETOCONAZOLE 20 MG/G
CREAM TOPICAL 2 TIMES DAILY
Qty: 60 G | Refills: 1 | Status: SHIPPED | OUTPATIENT
Start: 2021-12-08 | End: 2022-03-16

## 2021-12-08 RX ORDER — LEVONORGESTREL AND ETHINYL ESTRADIOL 0.15-0.03
1 KIT ORAL DAILY
Qty: 84 TABLET | Refills: 3 | Status: SHIPPED | OUTPATIENT
Start: 2021-12-08 | End: 2022-03-16

## 2021-12-08 ASSESSMENT — ENCOUNTER SYMPTOMS
BREAST MASS: 0
ABDOMINAL PAIN: 1
HEMATOCHEZIA: 1

## 2021-12-08 ASSESSMENT — PATIENT HEALTH QUESTIONNAIRE - PHQ9
SUM OF ALL RESPONSES TO PHQ QUESTIONS 1-9: 6
SUM OF ALL RESPONSES TO PHQ QUESTIONS 1-9: 6
10. IF YOU CHECKED OFF ANY PROBLEMS, HOW DIFFICULT HAVE THESE PROBLEMS MADE IT FOR YOU TO DO YOUR WORK, TAKE CARE OF THINGS AT HOME, OR GET ALONG WITH OTHER PEOPLE: SOMEWHAT DIFFICULT

## 2021-12-08 ASSESSMENT — MIFFLIN-ST. JEOR: SCORE: 1552.79

## 2021-12-08 NOTE — PROGRESS NOTES
SUBJECTIVE:   CC: Angie Reynolds is an 26 year old woman who presents for preventive health visit.       Patient has been advised of split billing requirements and indicates understanding: Yes  Healthy Habits:     Getting at least 3 servings of Calcium per day:  NO    Bi-annual eye exam:  NO    Dental care twice a year:  Yes    Sleep apnea or symptoms of sleep apnea:  None    Diet:  Regular (no restrictions)    Frequency of exercise:  2-3 days/week    Duration of exercise:  Less than 15 minutes    Taking medications regularly:  Yes    Medication side effects:  None    PHQ-2 Total Score: 4    Additional concerns today:  No  -possibly pregnant took 4 tests all negative hasnt gotten period in 2 months     Last period was the first week of October. No issues with irregular menses. Currently sexually active. Not using birth control, not trying to get pregnant. Has taken a couple of pregnancy tests at home which were negative. No nausea or fatigue.     Also reports that she is having quite a bit of moodiness. Not sure if she is anxious or depressed, is short with people at work and around her and just doesn't feel herself. Would like to do something about this.     Has a rash on her back. Not itchy or painful, wonders if it is a sunburn.       Today's PHQ-2 Score:   PHQ-2 ( 1999 Pfizer) 12/8/2021   Q1: Little interest or pleasure in doing things -   Q2: Feeling down, depressed or hopeless -   PHQ-2 Score -   PHQ-2 Total Score (12-17 Years)- Positive if 3 or more points; Administer PHQ-A if positive -   Q1: Little interest or pleasure in doing things -   Q2: Feeling down, depressed or hopeless -   PHQ-2 Score Incomplete       Abuse: Current or Past (Physical, Sexual or Emotional) - No  Do you feel safe in your environment? Yes    Have you ever done Advance Care Planning? (For example, a Health Directive, POLST, or a discussion with a medical provider or your loved ones about your wishes): No, advance care planning  information given to patient to review.  Patient declined advance care planning discussion at this time.    Social History     Tobacco Use     Smoking status: Never Smoker     Smokeless tobacco: Never Used   Substance Use Topics     Alcohol use: Yes         Alcohol Use 3/1/2019   Prescreen: >3 drinks/day or >7 drinks/week? No       Reviewed orders with patient.  Reviewed health maintenance and updated orders accordingly - Yes  Patient Active Problem List   Diagnosis     Morbid obesity (H)     Past Surgical History:   Procedure Laterality Date     PE TUBES         Social History     Tobacco Use     Smoking status: Never Smoker     Smokeless tobacco: Never Used   Substance Use Topics     Alcohol use: Yes     Family History   Problem Relation Age of Onset     Asthma Mother      Heart Disease Maternal Grandfather      Asthma Brother      Depression Sister         post partum     Depression Sister         post partum           Breast Cancer Screening:        History of abnormal Pap smear: NO - age 21-29 PAP every 3 years recommended  PAP / HPV 3/1/2019   PAP (Historical) NIL     Reviewed and updated as needed this visit by clinical staff                Reviewed and updated as needed this visit by Provider                   Review of Systems   Constitutional: Negative for chills and fever.   HENT: Negative for congestion, ear pain, hearing loss and sore throat.    Eyes: Negative for pain and visual disturbance.   Respiratory: Negative for cough and shortness of breath.    Cardiovascular: Negative for chest pain, palpitations and peripheral edema.   Gastrointestinal: Positive for abdominal pain and hematochezia. Negative for constipation, diarrhea, heartburn and nausea.   Breasts:  Negative for tenderness, breast mass and discharge.   Genitourinary: Negative for dysuria, frequency, genital sores, hematuria, pelvic pain, urgency, vaginal bleeding and vaginal discharge.   Musculoskeletal: Negative for arthralgias, joint  "swelling and myalgias.   Skin: Negative for rash.   Neurological: Negative for dizziness, weakness, headaches and paresthesias.   Psychiatric/Behavioral: Negative for mood changes. The patient is not nervous/anxious.           OBJECTIVE:   /72 (BP Location: Right arm, Patient Position: Chair, Cuff Size: Adult Regular)   Pulse 56   Temp 98.6  F (37  C) (Tympanic)   Ht 1.497 m (4' 10.94\")   Wt 89.8 kg (198 lb)   LMP 10/01/2021 (Approximate)   SpO2 100%   Breastfeeding No   BMI 40.07 kg/m    Physical Exam  GENERAL: healthy, alert and no distress  EYES: Eyes grossly normal to inspection, PERRL and conjunctivae and sclerae normal  HENT: ear canals and TM's normal, nose and mouth without ulcers or lesions  NECK: no adenopathy, no asymmetry, masses, or scars and thyroid normal to palpation  RESP: lungs clear to auscultation - no rales, rhonchi or wheezes  CV: regular rate and rhythm, normal S1 S2, no S3 or S4, no murmur, click or rub, no peripheral edema and peripheral pulses strong  ABDOMEN: soft, nontender, no hepatosplenomegaly, no masses and bowel sounds normal   (female): normal female external genitalia, normal urethral meatus, vaginal mucosa pink, moist, well rugated, and normal cervix/adnexa/uterus without masses or discharge  MS: no gross musculoskeletal defects noted, no edema  SKIN: no suspicious lesions or rashes  NEURO: Normal strength and tone, mentation intact and speech normal  PSYCH: mentation appears normal, affect normal/bright    Diagnostic Test Results:  Labs reviewed in Epic    ASSESSMENT/PLAN:   1. Routine general medical examination at a health care facility  Declines HPV and COVID vaccines today.     2. Screening for HIV (human immunodeficiency virus)  - HIV Antigen Antibody Combo    3. Need for hepatitis C screening test  - Hepatitis C Screen Reflex to HCV RNA Quant and Genotype    4. Screening for STDs (sexually transmitted diseases)  - CHLAMYDIA TRACHOMATIS PCR  - NEISSERIA " GONORRHOEA PCR    5. Encounter for initial prescription of contraceptive pills  Discussed starting birth control, reviewed available methods. Patient elected to start OCP. Reviewed timing methods for starting medication, risk and signs/symptoms of blood clots, smoking avoidance, condom use during first month to prevent pregnancy and STIs. Patient expressed understanding.   - levonorgestrel-ethinyl estradiol (MARLISSA) 0.15-30 MG-MCG tablet; Take 1 tablet by mouth daily  Dispense: 84 tablet; Refill: 3    6. Cervical cancer screening  - Pap screen only - recommended age 21 - 24 years    7. Missed periods  Has not yet missed 3 menstrual cycles which would be consistent with secondary amenorrhea. Pregnancy test here negative, discussed starting birth control if she wishes to prevent pregnancy. In meantime, will obtain basic lab work-up. Would also consider PCOS, although prior periods were regular.   - Follicle stimulating hormone  - Prolactin  - TSH with free T4 reflex  - HCG Qual, Urine ([id number])    8. Adjustment disorder with mixed anxiety and depressed mood  Discussed medication vs therapy, patient is interested in possible therapy. Will refer to [facility] to start, plan to follow-up if symptoms aren't improving to discuss medication options.     9. Rash  Seems consistent with tinea versicolor, will try topical antifungal.   - ketoconazole (NIZORAL) 2 % external cream; Apply topically 2 times daily  Dispense: 60 g; Refill: 1    10. Morbid obesity (H)  Discussed importance of weight loss through lifestyle modifications including exercise and healthful eating habits.         COUNSELING:  Reviewed preventive health counseling, as reflected in patient instructions       Regular exercise       Healthy diet/nutrition       Contraception       Safe sex practices/STD prevention       HIV screeninx in teen years, 1x in adult years, and at intervals if high risk    Estimated body mass index is 37.16 kg/m  as calculated from the  "following:    Height as of 11/6/19: 1.497 m (4' 10.94\").    Weight as of 11/6/19: 83.3 kg (183 lb 9.6 oz).    Weight management plan: Discussed healthy diet and exercise guidelines    She reports that she has never smoked. She has never used smokeless tobacco.      Counseling Resources:  ATP IV Guidelines  Pooled Cohorts Equation Calculator  Breast Cancer Risk Calculator  BRCA-Related Cancer Risk Assessment: FHS-7 Tool  FRAX Risk Assessment  ICSI Preventive Guidelines  Dietary Guidelines for Americans, 2010  USDA's MyPlate  ASA Prophylaxis  Lung CA Screening    Caitlin Segura MD  [employer] [facility]  "

## 2021-12-08 NOTE — PATIENT INSTRUCTIONS
Pregnancy test today. If this is negative, then it would be fine to start birth control.    Starting birth control:  1. Same day: Start today, use condoms for 1 month to prevent pregnancy. Spotting not unusual, usually goes away by 3 months.  2. Sunday start: wait, and start Sunday. Use condoms for 1 month to prevent pregnancy. Spotting not unusual, usually goes away by 3 months.  3. Wait for period: Start the first Sunday after period starts. Use condoms for 2 weeks to prevent pregnancy. Spotting not unusual for up to 3 months.    Call us if you develop: redness, swelling, pain in arm or leg. This can be a sign of a blood clot that needs to be treated. Avoid smoking, as this increases your risk for blood clots.    Always use condoms to prevent sexually transmitted infections.    We will check some labs as well for other causes of missed periods.     Our therapist will reach out to you to set up an appointment to discuss mental health concerns as well!    Preventive Health Recommendations  Female Ages 21 to 25     Yearly exam:     See your health care provider every year in order to  o Review health changes.   o Discuss preventive care.    o Review your medicines if your doctor has prescribed any.      You should be tested each year for STDs (sexually transmitted diseases).       Talk to your provider about how often you should have cholesterol testing.      Get a Pap test every three years. If you have an abnormal result, your doctor may have you test more often.      If you are at risk for diabetes, you should have a diabetes test (fasting glucose).     Shots:     Get a flu shot each year.     Get a tetanus shot every 10 years.     Consider getting the shot (vaccine) that prevents cervical cancer (Gardasil).    Nutrition:     Eat at least 5 servings of fruits and vegetables each day.    Eat whole-grain bread, whole-wheat pasta and brown rice instead of white grains and rice.    Get adequate Calcium and Vitamin D.      Lifestyle    Exercise at least 150 minutes a week each week (30 minutes a day, 5 days a week). This will help you control your weight and prevent disease.    Limit alcohol to one drink per day.    No smoking.     Wear sunscreen to prevent skin cancer.    See your dentist every six months for an exam and cleaning.  Patient Education   Personalized Prevention Plan  You are due for the preventive services outlined below.  Your care team is available to assist you in scheduling these services.  If you have already completed any of these items, please share that information with your care team to update in your medical record.  Health Maintenance Due   Topic Date Due     ANNUAL REVIEW OF HM ORDERS  Never done     Discuss Advance Care Planning  Never done     COVID-19 Vaccine (1) Never done     HIV Screening  Never done     Hepatitis C Screening  Never done     HPV Vaccine (2 - 3-dose series) 03/29/2019     Preventive Care Visit  03/01/2020     Chlamydia Screening  03/01/2020     PHQ-2  01/01/2021     Flu Vaccine (1) 09/01/2021     PAP Smear  03/01/2022

## 2021-12-09 LAB
FSH SERPL-ACNC: 1.3 IU/L
HCG UR QL: NEGATIVE
HCV AB SERPL QL IA: NONREACTIVE
HIV 1+2 AB+HIV1 P24 AG SERPL QL IA: NONREACTIVE
PROLACTIN SERPL-MCNC: 38 UG/L (ref 3–27)

## 2021-12-09 ASSESSMENT — PATIENT HEALTH QUESTIONNAIRE - PHQ9: SUM OF ALL RESPONSES TO PHQ QUESTIONS 1-9: 6

## 2021-12-09 ASSESSMENT — ENCOUNTER SYMPTOMS
HEADACHES: 0
PALPITATIONS: 0
DIARRHEA: 0
EYE PAIN: 0
SORE THROAT: 0
HEMATURIA: 0
NAUSEA: 0
CHILLS: 0
NERVOUS/ANXIOUS: 0
CONSTIPATION: 0
COUGH: 0
DYSURIA: 0
SHORTNESS OF BREATH: 0
MYALGIAS: 0
WEAKNESS: 0
FEVER: 0
DIZZINESS: 0
JOINT SWELLING: 0
PARESTHESIAS: 0
HEARTBURN: 0
FREQUENCY: 0
ARTHRALGIAS: 0

## 2021-12-10 DIAGNOSIS — N92.6 MISSED PERIODS: Primary | ICD-10-CM

## 2021-12-10 LAB
C TRACH DNA SPEC QL NAA+PROBE: NEGATIVE
N GONORRHOEA DNA SPEC QL NAA+PROBE: NEGATIVE

## 2021-12-11 LAB — TSH SERPL DL<=0.005 MIU/L-ACNC: 1.21 MU/L (ref 0.4–4)

## 2021-12-12 ENCOUNTER — APPOINTMENT (OUTPATIENT)
Dept: URGENT CARE | Facility: CLINIC | Age: 26
End: 2021-12-12
Payer: MEDICAID

## 2021-12-13 LAB
BKR LAB AP GYN ADEQUACY: NORMAL
BKR LAB AP GYN INTERPRETATION: NORMAL
BKR LAB AP HPV REFLEX: NO
BKR LAB AP LMP: NORMAL
BKR LAB AP PREVIOUS ABNORMAL: NORMAL
PATH REPORT.COMMENTS IMP SPEC: NORMAL
PATH REPORT.COMMENTS IMP SPEC: NORMAL
PATH REPORT.RELEVANT HX SPEC: NORMAL

## 2021-12-16 ENCOUNTER — TELEPHONE (OUTPATIENT)
Dept: PEDIATRICS | Facility: CLINIC | Age: 26
End: 2021-12-16
Payer: MEDICAID

## 2021-12-16 NOTE — TELEPHONE ENCOUNTER
Call placed to pt with message from provider  LM to return call to the clinic    Thank you  Alondra Crabtree RN on 12/16/2021 at 12:07 PM

## 2021-12-16 NOTE — TELEPHONE ENCOUNTER
Can we call patient? Prolactin level was slightly elevated, which may be contributing to her missed periods. Would recommend rechecking this when she has been fasting - can we get her scheduled for fasting lab appointment? If it is still elevated, we would then proceed with further diagnostic testing.    Caitlin Rice MD  Internal Medicine-Pediatrics

## 2022-01-16 ENCOUNTER — HEALTH MAINTENANCE LETTER (OUTPATIENT)
Age: 27
End: 2022-01-16

## 2022-01-18 ENCOUNTER — TELEPHONE (OUTPATIENT)
Dept: FAMILY MEDICINE | Facility: CLINIC | Age: 27
End: 2022-01-18
Payer: MEDICAID

## 2022-01-18 NOTE — TELEPHONE ENCOUNTER
Writer cannot find the code for the [facility] support staff pool for FMOB - pls forward to [facility] support staff or scheduling pool - This pt was incorrectly transferred to the [facility]. Pt is looking to schedule an early prenatal appt at [facility] for dating questions. Pt had a positive home pregnancy test. Writer called Women's Health number for [facility] [phone], and the call center staff member said she can only schedule for Dr Yi and no other providers at [facility]. She said Dr Yi is booked until March. Can someone please call this pt back and schedule her with a midwife OR a FMOB so she can be seen sooner than March? Thank you.

## 2022-01-19 NOTE — TELEPHONE ENCOUNTER
This was taken care of by someone else.  Patient has been scheduled with nurse for OB education.    ALEXANDRA Alvarenga  [facility]

## 2022-01-21 ENCOUNTER — PRENATAL OFFICE VISIT (OUTPATIENT)
Dept: OBGYN | Facility: CLINIC | Age: 27
End: 2022-01-21
Payer: MEDICAID

## 2022-01-21 DIAGNOSIS — Z23 NEED FOR TDAP VACCINATION: ICD-10-CM

## 2022-01-21 DIAGNOSIS — Z34.01 ENCOUNTER FOR SUPERVISION OF NORMAL FIRST PREGNANCY IN FIRST TRIMESTER: Primary | ICD-10-CM

## 2022-01-21 PROCEDURE — 99207 PR NO CHARGE NURSE ONLY: CPT

## 2022-01-21 NOTE — PROGRESS NOTES
Telephone visit with patient for New Prenatal Intake and Education. This is patient's first pregnancy. Handouts reviewed and will be provided at next prenatal appointment. Scheduled for New Prenatal with Lu MARROQUIN CNP on 1/25/2022.       Prenatal OB Questionnaire  Patient supplied answers from flow sheet for:  Prenatal OB Questionnaire.  Past Medical History  Have you ever recieved care for your mental health? : No  Have you ever been in a major accident or suffered serious trauma?: No  Within the last year, has anyone hit, slapped, kicked or otherwise hurt you?: No  In the last year, has anyone forced you to have sex when you didn't want to?: No    Past Medical History 2   Have you ever received a blood transfusion?: No  Would you accept a blood transfusion if was medically recommended?: Yes  Does anyone in your home smoke?: No   Is your blood type Rh negative?: Unknown  Have you ever ?: No  Have you been hospitalized for a nonsurgical reason excluding normal delivery?: No  Have you ever had an abnormal pap smear?: No    Past Medical History (Continued)  Do you have a history of abnormalities of the uterus?: No  Did your mother take ANDI or any other hormones when she was pregnant with you?: Unknown  Do you have any other problems we have not asked about which you feel may be important to this pregnancy?: No    PHQ-2 Score:     PHQ-2 ( 1999 Pfizer) 1/21/2022 12/8/2021   Q1: Little interest or pleasure in doing things 1 3   Q2: Feeling down, depressed or hopeless 0 1   PHQ-2 Score 1 4   PHQ-2 Total Score (12-17 Years)- Positive if 3 or more points; Administer PHQ-A if positive - -   Q1: Little interest or pleasure in doing things - Nearly every day   Q2: Feeling down, depressed or hopeless - Several days   PHQ-2 Score - 4       Allergies as of 1/21/2022:    Allergies as of 01/21/2022 - Reviewed 01/21/2022   Allergen Reaction Noted     Penicillins Hives and Shortness Of Breath 09/13/2010        Current medications are:  Current Outpatient Medications   Medication Sig Dispense Refill     ketoconazole (NIZORAL) 2 % external cream Apply topically 2 times daily (Patient not taking: Reported on 1/21/2022) 60 g 1     levonorgestrel-ethinyl estradiol (MARLISSA) 0.15-30 MG-MCG tablet Take 1 tablet by mouth daily (Patient not taking: Reported on 1/21/2022) 84 tablet 3     polyethylene glycol (MIRALAX/GLYCOLAX) powder Take 17 g (1 capful) by mouth daily (Patient not taking: Reported on 1/21/2022) 500 g 5         Early ultrasound screening tool:    Does patient have irregular periods?  No  Did patient use hormonal birth control in the three months prior to positive urine pregnancy test? No  Is the patient breastfeeding?  No  Is the patient 10 weeks or greater at time of education visit?  Yes    Evie Alexis RN on 1/21/2022 at 1:25 PM

## 2022-01-24 ENCOUNTER — TELEPHONE (OUTPATIENT)
Dept: OBGYN | Facility: CLINIC | Age: 27
End: 2022-01-24
Payer: MEDICAID

## 2022-01-24 NOTE — TELEPHONE ENCOUNTER
Called patient to reschedule appointment as patient has never been seen here and would need either a THANH appointment or OBI appointment. Patient did not answer and cannot leave message so will send a mychart message too.    Destinee Appiah  Clinical Services Assistant

## 2022-02-10 ENCOUNTER — OFFICE VISIT (OUTPATIENT)
Dept: OBGYN | Facility: CLINIC | Age: 27
End: 2022-02-10
Attending: REGISTERED NURSE
Payer: COMMERCIAL

## 2022-02-10 ENCOUNTER — TELEPHONE (OUTPATIENT)
Dept: OBGYN | Facility: CLINIC | Age: 27
End: 2022-02-10

## 2022-02-10 ENCOUNTER — ANCILLARY PROCEDURE (OUTPATIENT)
Dept: ULTRASOUND IMAGING | Facility: CLINIC | Age: 27
End: 2022-02-10
Attending: MIDWIFE
Payer: COMMERCIAL

## 2022-02-10 VITALS
HEIGHT: 59 IN | WEIGHT: 192.9 LBS | DIASTOLIC BLOOD PRESSURE: 67 MMHG | SYSTOLIC BLOOD PRESSURE: 100 MMHG | RESPIRATION RATE: 16 BRPM | BODY MASS INDEX: 38.89 KG/M2 | HEART RATE: 64 BPM

## 2022-02-10 DIAGNOSIS — Z34.91 ENCOUNTER FOR SUPERVISION OF NORMAL PREGNANCY IN FIRST TRIMESTER: Primary | ICD-10-CM

## 2022-02-10 DIAGNOSIS — Z32.01 POSITIVE URINE PREGNANCY TEST: ICD-10-CM

## 2022-02-10 DIAGNOSIS — Z34.01 ENCOUNTER FOR SUPERVISION OF NORMAL FIRST PREGNANCY IN FIRST TRIMESTER: Primary | ICD-10-CM

## 2022-02-10 DIAGNOSIS — Z32.01 POSITIVE URINE PREGNANCY TEST: Primary | ICD-10-CM

## 2022-02-10 PROCEDURE — 76817 TRANSVAGINAL US OBSTETRIC: CPT | Mod: GC | Performed by: RADIOLOGY

## 2022-02-10 PROCEDURE — 76801 OB US < 14 WKS SINGLE FETUS: CPT | Mod: GC | Performed by: RADIOLOGY

## 2022-02-10 ASSESSMENT — MIFFLIN-ST. JEOR: SCORE: 1530.62

## 2022-02-10 NOTE — LETTER
2/10/2022       RE: Angie Reynolds  [address]  [place] [address]     Dear Colleague,    Thank you for referring your patient, Angie Reynolds, to the [facility]. Please see a copy of my visit note below.    Pt was here for NOB appt.  Pt       Again, thank you for allowing me to participate in the care of your patient.      Sincerely,    S Nurse

## 2022-02-10 NOTE — LETTER
Date:February 11, 2022      Provider requested that no letter be sent. Do not send.       [facility]

## 2022-03-01 ENCOUNTER — LAB (OUTPATIENT)
Dept: LAB | Facility: CLINIC | Age: 27
End: 2022-03-01
Attending: RADIOLOGY
Payer: COMMERCIAL

## 2022-03-01 ENCOUNTER — OFFICE VISIT (OUTPATIENT)
Dept: OBGYN | Facility: CLINIC | Age: 27
End: 2022-03-01
Attending: RADIOLOGY
Payer: COMMERCIAL

## 2022-03-01 VITALS
BODY MASS INDEX: 39.29 KG/M2 | DIASTOLIC BLOOD PRESSURE: 60 MMHG | SYSTOLIC BLOOD PRESSURE: 103 MMHG | WEIGHT: 194.9 LBS | HEART RATE: 60 BPM | HEIGHT: 59 IN

## 2022-03-01 DIAGNOSIS — Z34.91 ENCOUNTER FOR SUPERVISION OF NORMAL PREGNANCY IN FIRST TRIMESTER: ICD-10-CM

## 2022-03-01 DIAGNOSIS — O09.90 HIGH-RISK PREGNANCY, UNSPECIFIED TRIMESTER: Primary | ICD-10-CM

## 2022-03-01 DIAGNOSIS — E66.01 MORBID OBESITY (H): ICD-10-CM

## 2022-03-01 DIAGNOSIS — O09.90 HIGH-RISK PREGNANCY, UNSPECIFIED TRIMESTER: ICD-10-CM

## 2022-03-01 DIAGNOSIS — Z34.01 ENCOUNTER FOR SUPERVISION OF NORMAL FIRST PREGNANCY IN FIRST TRIMESTER: ICD-10-CM

## 2022-03-01 LAB
ABO/RH(D): NORMAL
ANTIBODY SCREEN: NEGATIVE
ERYTHROCYTE [DISTWIDTH] IN BLOOD BY AUTOMATED COUNT: 12.1 % (ref 10–15)
HBA1C MFR BLD: 4.9 % (ref 0–5.6)
HCT VFR BLD AUTO: 38.1 % (ref 35–47)
HGB BLD-MCNC: 13 G/DL (ref 11.7–15.7)
MCH RBC QN AUTO: 31.4 PG (ref 26.5–33)
MCHC RBC AUTO-ENTMCNC: 34.1 G/DL (ref 31.5–36.5)
MCV RBC AUTO: 92 FL (ref 78–100)
PLATELET # BLD AUTO: 320 10E3/UL (ref 150–450)
RBC # BLD AUTO: 4.14 10E6/UL (ref 3.8–5.2)
SPECIMEN EXPIRATION DATE: NORMAL
TSH SERPL DL<=0.005 MIU/L-ACNC: 0.74 MU/L (ref 0.4–4)
WBC # BLD AUTO: 9 10E3/UL (ref 4–11)

## 2022-03-01 PROCEDURE — G0463 HOSPITAL OUTPT CLINIC VISIT: HCPCS

## 2022-03-01 PROCEDURE — 86850 RBC ANTIBODY SCREEN: CPT

## 2022-03-01 PROCEDURE — 87340 HEPATITIS B SURFACE AG IA: CPT

## 2022-03-01 PROCEDURE — 81511 FTL CGEN ABNOR FOUR ANAL: CPT

## 2022-03-01 PROCEDURE — 83036 HEMOGLOBIN GLYCOSYLATED A1C: CPT

## 2022-03-01 PROCEDURE — 84443 ASSAY THYROID STIM HORMONE: CPT

## 2022-03-01 PROCEDURE — 86780 TREPONEMA PALLIDUM: CPT

## 2022-03-01 PROCEDURE — 85027 COMPLETE CBC AUTOMATED: CPT

## 2022-03-01 PROCEDURE — 86762 RUBELLA ANTIBODY: CPT

## 2022-03-01 PROCEDURE — 87086 URINE CULTURE/COLONY COUNT: CPT

## 2022-03-01 PROCEDURE — 86901 BLOOD TYPING SEROLOGIC RH(D): CPT

## 2022-03-01 PROCEDURE — 36415 COLL VENOUS BLD VENIPUNCTURE: CPT

## 2022-03-01 PROCEDURE — 86803 HEPATITIS C AB TEST: CPT

## 2022-03-01 PROCEDURE — 99207 PR PRENATAL VISIT: CPT | Performed by: ADVANCED PRACTICE MIDWIFE

## 2022-03-01 PROCEDURE — 87389 HIV-1 AG W/HIV-1&-2 AB AG IA: CPT

## 2022-03-01 PROCEDURE — 86706 HEP B SURFACE ANTIBODY: CPT

## 2022-03-01 NOTE — LETTER
3/1/2022       RE: Angie Reynolds  [address]  [place] [address]     Dear Colleague,    Thank you for referring your patient, Angie Reynolds, to the [facility] [place] [facility]. Please see a copy of my visit note below.    [facility] OB Intake note  Subjective   26 year old femalepresents to clinic for initiation of OB care. Patient's last menstrual period was 11/15/2021 (within weeks).  at 16w1d by Estimated Date of Delivery: Sep 19, 2022 based on LMP. Reviewed dating ultrasound. Pregnancy is unplanned but welcomed.    Partner name - Capo.        Symptoms since LMP include nausea and fatigue. Patient has tried these relief measures: diet modification and small frequent meals.    - Genetic/Infection questionnaire completed,. Pt  does not have a recent known exposure to Parvo or CMV so IgG/IgM testing WILL NOT be ordered.   Recommended Flu Vaccine.  Patient declined, will consider next visit    - Current Medications  Current Outpatient Medications   Medication Sig Dispense Refill     Prenatal Vit-Fe Fumarate-FA (PRENATAL MULTIVITAMIN  PLUS IRON) 27-1 MG TABS Take by mouth daily       aspirin (ASA) 81 MG chewable tablet Take 81 mg by mouth daily       clotrimazole (LOTRIMIN) 1 % vaginal cream Place 1 Applicatorful vaginally At Bedtime for 7 days 45 g 0         - Co-morbids  No past medical history on file.  - Risk for GDM : Pre pregnancy BMI>30so  WILL have an early GCT and Hgb A1C    - High risk factors for Pre E-  No known risk factors of High risk for Pre E       - Moderate risk factor for Pre E Nulliparity  and Pre Pregnancy body mass index >30   Meets one high risk factors or two  of the moderate risk facrtors  so WILL consider starting low dose aspirin (81mg) starting between 12 and 28 weeks to prevent early onset preeclampsia    - The patient  does not have a history of spontaneous  birth so  WILL NOT  consider progesterone starting at 16-20 weeks and/or serial transvaginal cervical length ultrasounds from 16-24 weeks.     -The patient does not have a history of immunosuppresion or HIV so Toxoplasma IgG/IgM WILL NOT be ordered.    -Assess risk for asymptomatic latent TB (prior infection, recent immigrant from epidemic areas, immunosuppression, living in overcrowded environment):   WILL NOT have PPD skin test or Quantiferon-TB Gold Plus blood draw. *both options valid*       PERSONAL/SOCIAL HISTORY  single  with partner.  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  Job involves moderate activity ..   Additional items: None    Objective  -VS: reviewed and within normal limits   -General appearance: no acute distress, patient is comfortable   NEUROLOGICAL/PSYCHIATRIC   - Orientated x3,   -Mood and affect: : normal     Assessment/Plan  Angie was seen today for prenatal care.    Diagnoses and all orders for this visit:    High-risk pregnancy  -     Maternal Quad Marker 2nd Trimester; Future  -     [facility] Referral - Pregnancy; Future    BMI 38  -     [facility] Referral - Pregnancy; Future        26 year old  16w1d weeks of pregnancy with MOLLY of Sep 19, 2022 by LMP of Patient's last menstrual period was 11/15/2021 (within weeks).. Ultrasound confirms.   Outpatient Encounter Medications as of 3/1/2022   Medication Sig Dispense Refill     Prenatal Vit-Fe Fumarate-FA (PRENATAL MULTIVITAMIN  PLUS IRON) 27-1 MG TABS Take by mouth daily       [DISCONTINUED] ketoconazole (NIZORAL) 2 % external cream Apply topically 2 times daily (Patient not taking: Reported on 2022) 60 g 1     [DISCONTINUED] levonorgestrel-ethinyl estradiol (MARLISSA) 0.15-30 MG-MCG tablet Take 1 tablet by mouth daily (Patient not taking: Reported on 2022) 84 tablet 3     [DISCONTINUED] polyethylene glycol (MIRALAX/GLYCOLAX) powder Take 17 g (1 capful) by mouth daily (Patient not taking: Reported on 2022) 500 g 5      No facility-administered encounter medications on file as of 3/1/2022.      Orders Placed This Encounter   Procedures     Maternal Quad Marker 2nd Trimester     [facility] Referral - Pregnancy                 Orders Placed This Encounter   Procedures     Maternal Quad Marker 2nd Trimester     [facility] Referral - Pregnancy         - Oriented to Practice, types of care, and how to reach a provider.  Pt prefers CNM team  - Patient received 1st trimester new OB education packet complete with aide of The Expectant Family booklet including information on genetic screening test options.  - Patient desires 1st trimester screening which were ordered.  - Educational handout on the prevention of infections diseases during pregnancy provided.  - Patient was encouraged to start prenatal vitamins as tolerated.    - Patient was sent to lab for routine OB labs .     - Reviewed risk for diabetes in pregnancy,   - Reviewed recommendation for low dose aspirin daily to prevent pre eclampsia, pt agrees, follow up at NOB visit.   - Pregnancy concerns to be addressed by provider at new OB exam include: none.    Pt to RTO for NOB visit in 4 weeks and prn if questions or concerns    Eladia Moy, CARA MITCHELL

## 2022-03-02 PROBLEM — Z78.9 NOT IMMUNE TO HEPATITIS B VIRUS: Status: ACTIVE | Noted: 2022-03-02

## 2022-03-02 LAB
HBV SURFACE AB SERPL IA-ACNC: 4.2 M[IU]/ML
HBV SURFACE AG SERPL QL IA: NONREACTIVE
HCV AB SERPL QL IA: NONREACTIVE
HIV 1+2 AB+HIV1 P24 AG SERPL QL IA: NONREACTIVE
RUBV IGG SERPL QL IA: 4.22 INDEX
RUBV IGG SERPL QL IA: POSITIVE
T PALLIDUM AB SER QL: NONREACTIVE

## 2022-03-03 ENCOUNTER — TRANSCRIBE ORDERS (OUTPATIENT)
Dept: MATERNAL FETAL MEDICINE | Facility: CLINIC | Age: 27
End: 2022-03-03
Payer: COMMERCIAL

## 2022-03-03 DIAGNOSIS — O26.90 PREGNANCY RELATED CONDITION, ANTEPARTUM: Primary | ICD-10-CM

## 2022-03-03 LAB — BACTERIA UR CULT: NORMAL

## 2022-03-04 LAB
# FETUSES US: ABNORMAL
AFP MOM SERPL: 0.56
AFP SERPL-MCNC: 14 NG/ML
AGE - REPORTED: 25.1 YR
CURRENT SMOKER: NO
FAMILY MEMBER DISEASES HX: NO
GA METHOD: ABNORMAL
GA: ABNORMAL WK
HCG MOM SERPL: 4.62
HCG SERPL-ACNC: ABNORMAL IU/L
HX OF HEREDITARY DISORDERS: NO
IDDM PATIENT QL: NO
INHIBIN A MOM SERPL: 5.06
INHIBIN A SERPL-MCNC: 723 PG/ML
INTEGRATED SCN PATIENT-IMP: ABNORMAL
PATHOLOGY STUDY: ABNORMAL
SPECIMEN DRAWN SERPL: ABNORMAL
U ESTRIOL MOM SERPL: 0.21
U ESTRIOL SERPL-MCNC: 0.14 NG/ML

## 2022-03-07 ENCOUNTER — TELEPHONE (OUTPATIENT)
Dept: OBGYN | Facility: CLINIC | Age: 27
End: 2022-03-07
Payer: COMMERCIAL

## 2022-03-07 DIAGNOSIS — O09.90 HIGH-RISK PREGNANCY, UNSPECIFIED TRIMESTER: Primary | ICD-10-CM

## 2022-03-07 DIAGNOSIS — O28.0 ABNORMAL QUAD SCREEN: ICD-10-CM

## 2022-03-08 ENCOUNTER — TRANSCRIBE ORDERS (OUTPATIENT)
Dept: MATERNAL FETAL MEDICINE | Facility: CLINIC | Age: 27
End: 2022-03-08
Payer: COMMERCIAL

## 2022-03-08 DIAGNOSIS — O28.0 ABNORMAL MATERNAL SERUM SCREENING TEST: Primary | ICD-10-CM

## 2022-03-08 DIAGNOSIS — O26.90 PREGNANCY RELATED CONDITION, ANTEPARTUM: Primary | ICD-10-CM

## 2022-03-08 NOTE — TELEPHONE ENCOUNTER
Call to patient regarding Quad screen results.     No answer and no option to leave voice mail (kept ringing) and notifying her of MyChart message and referral to [facility].     CARA NeelyM

## 2022-03-09 NOTE — PROGRESS NOTES
[facility] OB Intake note  Subjective   26 year old femalepresents to clinic for initiation of OB care. Patient's last menstrual period was 11/15/2021 (within weeks).  at 16w1d by Estimated Date of Delivery: Sep 19, 2022 based on LMP. Reviewed dating ultrasound. Pregnancy is unplanned but welcomed.    Partner name - Capo.        Symptoms since LMP include nausea and fatigue. Patient has tried these relief measures: diet modification and small frequent meals.    - Genetic/Infection questionnaire completed,. Pt  does not have a recent known exposure to Parvo or CMV so IgG/IgM testing WILL NOT be ordered.   Recommended Flu Vaccine.  Patient declined, will consider next visit    - Current Medications  Current Outpatient Medications   Medication Sig Dispense Refill     Prenatal Vit-Fe Fumarate-FA (PRENATAL MULTIVITAMIN  PLUS IRON) 27-1 MG TABS Take by mouth daily       aspirin (ASA) 81 MG chewable tablet Take 81 mg by mouth daily       clotrimazole (LOTRIMIN) 1 % vaginal cream Place 1 Applicatorful vaginally At Bedtime for 7 days 45 g 0         - Co-morbids  No past medical history on file.  - Risk for GDM : Pre pregnancy BMI>30so  WILL have an early GCT and Hgb A1C    - High risk factors for Pre E-  No known risk factors of High risk for Pre E       - Moderate risk factor for Pre E Nulliparity  and Pre Pregnancy body mass index >30   Meets one high risk factors or two  of the moderate risk facrtors  so WILL consider starting low dose aspirin (81mg) starting between 12 and 28 weeks to prevent early onset preeclampsia    - The patient  does not have a history of spontaneous  birth so  WILL NOT consider progesterone starting at 16-20 weeks and/or serial transvaginal cervical length ultrasounds from 16-24 weeks.     -The patient does not have a history of immunosuppresion or HIV so Toxoplasma IgG/IgM WILL NOT be ordered.    -Assess risk for asymptomatic latent TB (prior infection, recent immigrant from epidemic  areas, immunosuppression, living in overcrowded environment):   WILL NOT have PPD skin test or Quantiferon-TB Gold Plus blood draw. *both options valid*       PERSONAL/SOCIAL HISTORY  single  with partner.  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  Job involves moderate activity ..   Additional items: None    Objective  -VS: reviewed and within normal limits   -General appearance: no acute distress, patient is comfortable   NEUROLOGICAL/PSYCHIATRIC   - Orientated x3,   -Mood and affect: : normal     Assessment/Plan  Angie was seen today for prenatal care.    Diagnoses and all orders for this visit:    High-risk pregnancy  -     Maternal Quad Marker 2nd Trimester; Future  -     [facility] Referral - Pregnancy; Future    BMI 38  -     Mat [facility]etal Med [facility]tr Referral - Pregnancy; Future        26 year old  16w1d weeks of pregnancy with MOLLY of Sep 19, 2022 by LMP of Patient's last menstrual period was 11/15/2021 (within weeks).. Ultrasound confirms.   Outpatient Encounter Medications as of 3/1/2022   Medication Sig Dispense Refill     Prenatal Vit-Fe Fumarate-FA (PRENATAL MULTIVITAMIN  PLUS IRON) 27-1 MG TABS Take by mouth daily       [DISCONTINUED] ketoconazole (NIZORAL) 2 % external cream Apply topically 2 times daily (Patient not taking: Reported on 2022) 60 g 1     [DISCONTINUED] levonorgestrel-ethinyl estradiol (MARLISSA) 0.15-30 MG-MCG tablet Take 1 tablet by mouth daily (Patient not taking: Reported on 2022) 84 tablet 3     [DISCONTINUED] polyethylene glycol (MIRALAX/GLYCOLAX) powder Take 17 g (1 capful) by mouth daily (Patient not taking: Reported on 2022) 500 g 5     No facility-administered encounter medications on file as of 3/1/2022.      Orders Placed This Encounter   Procedures     Maternal Quad Marker 2nd Trimester     [facility] Referral - Pregnancy                 Orders Placed This Encounter   Procedures     Maternal Quad Marker 2nd Trimester     [facility]  Referral - Pregnancy         - Oriented to Practice, types of care, and how to reach a provider.  Pt prefers CNM team  - Patient received 1st trimester new OB education packet complete with aide of The Expectant Family booklet including information on genetic screening test options.  - Patient desires 1st trimester screening which were ordered.  - Educational handout on the prevention of infections diseases during pregnancy provided.  - Patient was encouraged to start prenatal vitamins as tolerated.    - Patient was sent to lab for routine OB labs .     - Reviewed risk for diabetes in pregnancy,   - Reviewed recommendation for low dose aspirin daily to prevent pre eclampsia, pt agrees, follow up at NOB visit.   - Pregnancy concerns to be addressed by provider at new OB exam include: none.    Pt to RTO for NOB visit in 4 weeks and prn if questions or concerns    Eladia Moy, CARA MITCHELL

## 2022-03-10 ENCOUNTER — PRE VISIT (OUTPATIENT)
Dept: MATERNAL FETAL MEDICINE | Facility: CLINIC | Age: 27
End: 2022-03-10
Payer: COMMERCIAL

## 2022-03-14 ENCOUNTER — OFFICE VISIT (OUTPATIENT)
Dept: MATERNAL FETAL MEDICINE | Facility: CLINIC | Age: 27
End: 2022-03-14
Attending: MIDWIFE
Payer: COMMERCIAL

## 2022-03-14 ENCOUNTER — HOSPITAL ENCOUNTER (OUTPATIENT)
Dept: ULTRASOUND IMAGING | Facility: CLINIC | Age: 27
Discharge: HOME | End: 2022-03-14
Attending: OBSTETRICS & GYNECOLOGY
Payer: COMMERCIAL

## 2022-03-14 ENCOUNTER — OFFICE VISIT (OUTPATIENT)
Dept: MATERNAL FETAL MEDICINE | Facility: CLINIC | Age: 27
End: 2022-03-14
Attending: OBSTETRICS & GYNECOLOGY
Payer: COMMERCIAL

## 2022-03-14 DIAGNOSIS — O26.90 PREGNANCY RELATED CONDITION, ANTEPARTUM: ICD-10-CM

## 2022-03-14 DIAGNOSIS — Z36.9 FIRST TRIMESTER SCREENING: Primary | ICD-10-CM

## 2022-03-14 DIAGNOSIS — O28.0 ABNORMAL MATERNAL SERUM SCREENING TEST: ICD-10-CM

## 2022-03-14 DIAGNOSIS — Z36.9 ANTENATAL SCREENING ENCOUNTER: Primary | ICD-10-CM

## 2022-03-14 PROCEDURE — 76813 OB US NUCHAL MEAS 1 GEST: CPT

## 2022-03-14 PROCEDURE — 76813 OB US NUCHAL MEAS 1 GEST: CPT | Mod: 26 | Performed by: OBSTETRICS & GYNECOLOGY

## 2022-03-14 PROCEDURE — 96040 HC GENETIC COUNSELING, EACH 30 MINUTES: CPT

## 2022-03-14 NOTE — PROGRESS NOTES
[facility]  Genetic Counseling Consult    Patient: Angie Reynolds YOB: 1995   Date of Service: 3/14/22      Angie Reynolds was seen at [facility] [place] for genetic consultation to discuss the options for routine screening for fetal chromosome abnormalities. Angie was accompanied to the appointment today by her partner, Abdias.         Impression/Plan:   1.  Angie had a genetic counseling session and nuchal translucency ultrasound today, on which nuchal translucency measurement was within normal limits and nasal bone was visualized. Angie declined further aneuploidy screening at this time. She is aware that genetic testing options remain available to her if she is interested or there is an indication later in the pregnancy.     Of note, Angie had a maternal quad screen drawn previously in the pregnancy at 11w1d, which indicated an increased risk for Down syndrome. However, this result is NOT valid as the quad screen is only valid between 14w0d and 24w6d. Her pregnancy is NOT considered at increased risk for Down syndrome based on this result.     2. Maternal serum AFP (single marker screen) can be considered after 15 weeks to screen for open neural tube defects.     Pregnancy History:   /Parity:    Age at Delivery: 27 year old  MOLLY: 2022, by Ultrasound  Gestational Age: 13w0d    No significant complications or exposures were reported in the current pregnancy.    Medical History:   Angie s reported medical history is not expected to impact pregnancy management or risks to fetal development.       Family History:   A three-generation pedigree was obtained, and is scanned under the  Media  tab.   The following significant findings were reported by Angie:    The father of the pregnancy, Abdias, is 20 and healthy.    Abdias reports that his maternal uncle had a son who passed away at 8 years old.  This child had significant brain/developmental abnormalities which were thought to be related to maternal drug use during pregnancy. This uncle also has a 1 year old daughter who has developmental delays, also possibly attributed to maternal exposures during pregnancy. Intellectual disability and developmental delays can be a broad spectrum and have multiple etiologies, which can include genetic and environmental factors. If a family history includes an individual with ID or developmental delays but unknown diagnosis, it is difficult to provide an accurate risk assessment. If these individuals' developmental concerns are due to prenatal exposures, this would not increase risks for other family members to be similarly affected. However, we reviewed that risks for recurrence due to a possible underlying genetic explanation could not be eliminated. If more information is collected, it would be reasonable to revisit this family history to provide a more accurate risk assessment in the future.       Abdias reports that his mother had ovarian cancer in her 20's. There is no other family history of cancer. Angie reports that her mother had thyroid cancer in her 40's. She ultimately passed away from unrelated health concerns, the details of which are not known. Angie's maternal uncle had anal cancer diagnosed in his 40's. Angie reports no other family history of cancer. None of the above individuals has had any genetic testing to the couple's knowledge. We briefly discussed the family history of cancer. We discussed that most cancer seen in families occurs sporadically, but about 5-10% may be due to an underlying genetic etiology. We discussed that ovarian cancer and cancers at young ages are rare and can be associated with inherited cancer predisposition syndromes. Genetic counseling is available for cancer syndromes. Cancer family history, even without genetic testing, can change cancer screening recommendations  for family members and aid in insurance coverage for access to them as well. The most informative individuals to complete cancer genetic counseling and genetic testing are those with a personal history of cancer or those closely related to the affected individuals. They were made aware of the [facility]'s cancer risk management clinic if they or their family members are interested in more information     Otherwise, the reported family history is negative for multiple miscarriages, stillbirths, birth defects, intellectual disability, known genetic conditions, and consanguinity.       Carrier Screening:   Angie and her partner Abdias are of  ancestry. Abdias is also of [place] and [place] ancestry.   Expanded carrier screening for mutations in a large panel of genes associated with autosomal recessive conditions including cystic fibrosis, thalassemias, hearing loss, spinal muscular atrophy, and others, is now available. We also reviewed that expanded carrier screening can assess for common X-linked recessive disorders such as Fragile X syndrome.     We reviewed availability of expanded carrier screening through [facility] for up to 289 conditions. Carrier screening can be done before or during a pregnancy. The purpose of carrier screening is providing an individual or couple with a personalized risk assessment for genetic conditions. This is accomplished by screening an individual to determine if they are a carrier for a genetic condition. For most conditions, both parents must be a carrier of the condition for the pregnancy to be at an increased risk. For other conditions that are X-linked, there is an increased risk when a female (mother) is a carrier and the concerns are greater if she passes that condition to a son.     The following was reviewed with Angie and Abdias:  If both parents are carriers of an autosomal recessive condition, there are three possible outcomes for each  pregnancy/child: 25% unaffected, 50% carrier, and 25% affected. Some couples may choose to pursue in vitro fertilization with PGT-M (preimplantation for genetic testing for single gene disorders) and only transfer unaffected embryos. The only method to determine the outcome or diagnose the condition in a pregnancy is an invasive testing option such as an amniocentesis. Some genetic conditions will have ultrasound findings during the pregnancy but many do not. Some couples will choose the diagnostic testing to plan for delivery or choose termination of an affected pregnancy. Other couples will choose to test for the condition after delivery. While these conditions cannot be cured or treated during the pregnancy it may guide management recommendations (ultrasounds) for pregnancy as well as delivery and infant care.   We discussed that expanded carrier screening is designed to identify carrier status for conditions that are primarily childhood or adolescent onset. Expanded carrier screening does not evaluate for adult-onset conditions such as hereditary cancer syndromes, dementia/Alzheimer's disease, or cardiovascular disease risk factors. Additionally, expanded carrier screening is not comprehensive for all known genetic diseases or inherited conditions. This is a screening test, and residual carrier status risk figures will be provided to the patient after results become available.   Carrier screening is not meant to diagnose the patient with a condition, and generally carriers are asymptomatic. However, certain genes may confer increased risks for various health concerns in carriers (for example, but not limited to: PADMA, FMR1, DMD).  There are implications for family members. If an individual is a carrier of a condition there is a chance for relatives to also be a carrier. This may be helpful information to disclose to family (siblings, cousins) so they may choose if they want to pursue carriers screening. In  addition, if only one parent is found to be a carrier, there is a 50% chance for each child to be a carrier. This may be helpful information for the patient's children when they start a family.  [facility] will contact the patient via text, email, or both with cost estimate information. The communication will list the cost billed through insurance and provide an option for self-pay. The default is insurance billing so patients must choose the self pay option and follow through with credit card information if desired. The self pay cost of NIPT is $99, carrier screening is $250 with partner carrier screening for $100. The patient has the responsibility to determine if insurance or self pay is a better financial decision.    After discussion today, Angie and Abdias were not sure whether they desired carrier screening and wanted to check with insurance first. They were given a pamphlet about their testing options and CPT codes (69062, 75178) as well as my direct contact information and were encouraged to reach out to me if they have additional questions or wish to pursue carrier screening.        Risk Assessment for Chromosome Conditions:   We explained that the risk for fetal chromosome abnormalities increases with maternal age. We discussed specific features of common chromosome abnormalities, including Down syndrome, trisomy 13, trisomy 18, and sex chromosome trisomies.      - At age 27 at midtrimester, the risk to have a baby with Down syndrome is 1 in 1040.    - At age 27 at midtrimester, the risk to have a baby with any chromosome abnormality is 1 in 520.     Angie had a quad screen drawn previously in the pregnancy, which indicated an increased risk of Down syndrome. However, as quad screen was drawn at 11w1d, this is NOT a valid result and Angie's pregnancy is NOT currently considered at high risk of Down syndrome. We reviewed today that Angie's LMP is 11/15/2021 and based on this her initial MOLLY was  8/22/2022. However, based on dating ultrasound on 2/10/2022, Angie's MOLLY is 9/19/2022. Based on MOLLY of 9/19/2022, Angie's quad screen was drawn at 11w1d and is NOT considered a valid result as the quad screen must be drawn between 14w0d and 24w6d. Today, we discussed options for aneuploidy screening in the first trimester, including first trimester screen and NIPT. After discussion, Angie elected to start with nuchal translucency ultrasound only today. She indicated that if nuchal translucency ultrasound was normal, she does not desire additional aneuploidy screening at this time. She is aware that genetic testing options remain available to her if she is interested or there is an indication later in the pregnancy.        Testing Options:   We discussed the following options:   First trimester screening    First trimester ultrasound with nuchal translucency and nasal bone assessments, maternal plasma hCG, RADHA-A, and AFP measurement    Screens for fetal trisomy 21, trisomy 13, and trisomy 18    Cannot screen for open neural tube defects; maternal serum AFP after 15 weeks is recommended     Non-invasive Prenatal Testing (NIPT)    Maternal plasma cell-free DNA testing; first trimester ultrasound with nuchal translucency and nasal bone assessment is recommended, when appropriate    Screens for fetal trisomy 21, trisomy 13, trisomy 18, and sex chromosome aneuploidy    Cannot screen for open neural tube defects; maternal serum AFP after 15 weeks is recommended     Chorionic villus sampling (CVS)    Invasive procedure typically performed in the first trimester by which placental villi are obtained for the purpose of chromosome analysis and/or other prenatal genetic analysis    Diagnostic results; >99% sensitivity for fetal chromosome abnormalities    Cannot test for open neural tube defects; maternal serum AFP after 15 weeks is recommended     Genetic Amniocentesis    Invasive procedure typically performed in the  second trimester by which amniotic fluid is obtained for the purpose of chromosome analysis and/or other prenatal genetic analysis    Diagnostic results; >99% sensitivity for fetal chromosome abnormalities    AFAFP measurement tests for open neural tube defects     Comprehensive (Level II) ultrasound: Detailed ultrasound performed between 18-22 weeks gestation to screen for major birth defects and markers for aneuploidy.    We reviewed the benefits and limitations of this testing.  Screening tests provide a risk assessment specific to the pregnancy for certain fetal chromosome abnormalities, but cannot definitively diagnose or exclude a fetal chromosome abnormality.  Follow-up genetic counseling and consideration of diagnostic testing is recommended with any abnormal screening result.      It was a pleasure to be involved with Angie s care. Face-to-face time of the meeting was 45 minutes.    Olivia Lehman, [facility], [facility]  Licensed Genetic Counselor  [facility]  Maternal Fetal Medicine  Phone: [phone]  [email or web address]

## 2022-03-14 NOTE — PROGRESS NOTES
"Please see \"Imaging\" tab under \"Chart Review\" for details of today's US at the [facility].    Raman Houser MD  Maternal-Fetal Medicine      "

## 2022-03-16 ENCOUNTER — OFFICE VISIT (OUTPATIENT)
Dept: OBGYN | Facility: CLINIC | Age: 27
End: 2022-03-16
Attending: REGISTERED NURSE
Payer: COMMERCIAL

## 2022-03-16 VITALS
SYSTOLIC BLOOD PRESSURE: 108 MMHG | WEIGHT: 198 LBS | BODY MASS INDEX: 39.92 KG/M2 | HEART RATE: 64 BPM | HEIGHT: 59 IN | DIASTOLIC BLOOD PRESSURE: 70 MMHG

## 2022-03-16 DIAGNOSIS — E66.01 MORBID OBESITY (H): ICD-10-CM

## 2022-03-16 DIAGNOSIS — O28.0 ABNORMAL QUAD SCREEN: ICD-10-CM

## 2022-03-16 DIAGNOSIS — O09.90 HIGH-RISK PREGNANCY, UNSPECIFIED TRIMESTER: Primary | ICD-10-CM

## 2022-03-16 DIAGNOSIS — N89.8 VAGINAL ITCHING: ICD-10-CM

## 2022-03-16 LAB — GLUCOSE 1H P 50 G GLC PO SERPL-MCNC: 114 MG/DL (ref 70–129)

## 2022-03-16 PROCEDURE — 82950 GLUCOSE TEST: CPT | Performed by: REGISTERED NURSE

## 2022-03-16 PROCEDURE — 99207 PR PRENATAL VISIT: CPT | Performed by: REGISTERED NURSE

## 2022-03-16 PROCEDURE — 36415 COLL VENOUS BLD VENIPUNCTURE: CPT | Performed by: REGISTERED NURSE

## 2022-03-16 PROCEDURE — 87210 SMEAR WET MOUNT SALINE/INK: CPT | Performed by: REGISTERED NURSE

## 2022-03-16 PROCEDURE — 87491 CHLMYD TRACH DNA AMP PROBE: CPT | Performed by: REGISTERED NURSE

## 2022-03-16 PROCEDURE — G0463 HOSPITAL OUTPT CLINIC VISIT: HCPCS

## 2022-03-16 PROCEDURE — 99202 OFFICE O/P NEW SF 15 MIN: CPT | Performed by: REGISTERED NURSE

## 2022-03-16 PROCEDURE — 87591 N.GONORRHOEAE DNA AMP PROB: CPT | Performed by: REGISTERED NURSE

## 2022-03-16 RX ORDER — CLOTRIMAZOLE 1 %
1 CREAM WITH APPLICATOR VAGINAL AT BEDTIME
Qty: 45 G | Refills: 0 | Status: SHIPPED | OUTPATIENT
Start: 2022-03-16 | End: 2022-03-23

## 2022-03-16 RX ORDER — CLOTRIMAZOLE 1 %
1 CREAM WITH APPLICATOR VAGINAL AT BEDTIME
Qty: 45 G | Refills: 1 | Status: CANCELLED | OUTPATIENT
Start: 2022-03-16 | End: 2022-03-23

## 2022-03-16 ASSESSMENT — PAIN SCALES - GENERAL: PAINLEVEL: NO PAIN (0)

## 2022-03-16 NOTE — LETTER
"3/16/2022       RE: Angie Reynolds  [address]  [facility] [address]     Dear Colleague,    Thank you for referring your patient, Angie Reynolds, to the [facility] [place] [facility]. Please see a copy of my visit note below.    SUBJECTIVE:   Angie is a 26 year old female who presents to clinic for a new OB visit.   at 13w2d with Estimated Date of Delivery: Sep 19, 2022 based on US confirms. Feels well. Has started PNV.     She has had light pink spotting 2 weeks ago with wiping, no bright red bleeding since LMP.  She has not had nausea. Weight loss has not occurred.   This was a planned pregnancy.   Partner is involved, Abdias Agosto     OTHER CONCERNS:   - She is here today with her partner Abdias, they are very excited about this pregnancy!   - Having some itching and irritation of vagina, tried monistat last week which did not help. Reports she has used some baby powder and that has helped. Feels like her vagina has been \"moist\". Denies abnormal discharge or abnormal odor.   - Wondering if it is safe to use a doppler at home for fetal heart tones    - Reviewed change to her pregnancy dating based on recent ultrasound and MOLLY of 22    ===========================================  ROS: 10 point ROS neg other than the symptoms noted above in the HPI.    PSYCHIATRIC:  Denies mood changes.  No history.     PHQ-9 score:    PHQ-9 SCORE 2021   PHQ-9 Total Score MyChart 6 (Mild depression)   PHQ-9 Total Score 6       No flowsheet data found.    Past History:  Her past medical history No past medical history on file..   This is her first pregnancy  Since her last LMP she denies use of alcohol, tobacco and street drugs.    HISTORY:  Family History   Problem Relation Age of Onset     Asthma Mother      Obesity Mother         lapband surgery, surgical complication-bowel lac, septic shock-     " Depression Sister         post partum     Depression Sister         post partum     Asthma Brother      No Known Problems Brother      Heart Disease Maternal Grandfather      Social History     Socioeconomic History     Marital status: Single     Spouse name: Capo     Number of children: Not on file     Years of education: Not on file     Highest education level: Not on file   Occupational History     Occupation: [employer]     Comment: parttime   Tobacco Use     Smoking status: Former Smoker     Smokeless tobacco: Never Used     Tobacco comment: vaped 9/2021   Vaping Use     Vaping Use: Former     Quit date: 10/21/2021   Substance and Sexual Activity     Alcohol use: Not Currently     Drug use: Yes     Types: Marijuana     Sexual activity: Yes     Partners: Male   Other Topics Concern     Parent/sibling w/ CABG, MI or angioplasty before 65F 55M? Not Asked   Social History Narrative     Not on file     Social Determinants of Health     Financial Resource Strain: Not on file   Food Insecurity: Not on file   Transportation Needs: Not on file   Physical Activity: Not on file   Stress: Not on file   Social Connections: Not on file   Intimate Partner Violence: Not on file   Housing Stability: Not on file     Current Outpatient Medications   Medication Sig     ketoconazole (NIZORAL) 2 % external cream Apply topically 2 times daily (Patient not taking: Reported on 1/21/2022)     levonorgestrel-ethinyl estradiol (MARLISSA) 0.15-30 MG-MCG tablet Take 1 tablet by mouth daily (Patient not taking: Reported on 1/21/2022)     polyethylene glycol (MIRALAX/GLYCOLAX) powder Take 17 g (1 capful) by mouth daily (Patient not taking: Reported on 1/21/2022)     Prenatal Vit-Fe Fumarate-FA (PRENATAL MULTIVITAMIN  PLUS IRON) 27-1 MG TABS Take by mouth daily     No current facility-administered medications for this visit.     Allergies   Allergen Reactions     Penicillins Hives and Shortness Of Breath        ============================================  MEDICAL HISTORY  No past medical history on file.  Past Surgical History:   Procedure Laterality Date     PE TUBES         OB History    Para Term  AB Living   1 0 0 0 0 0   SAB IAB Ectopic Multiple Live Births   0 0 0 0 0      # Outcome Date GA Lbr Brody/2nd Weight Sex Delivery Anes PTL Lv   1 Current                  GYN History- No Abnormal Pap Smears                        Cervical procedures: no                        History of STI: none    I personally reviewed the past social/family/medical and surgical history on the date of service.   I reviewed lab work done at Intake visit with patient.    EXAM:  LMP 11/15/2021 (Within Weeks)    EXAM:  GENERAL:  Pleasant pregnant female, alert, cooperative and well groomed.  SKIN:  Warm and dry, without lesions or rashes  HEAD: Symmetrical features.  NECK:  Thyroid without enlargement and nodules.  Lymph nodes not palpable.   LUNGS:  Clear to auscultation.  BREAST:    No dominant, fixed or suspicious masses are noted.  No skin or nipple changes or axillary nodes.   Nipples everted.      HEART:  RRR without murmur.  ABDOMEN: Soft without masses , tenderness or organomegaly.  No CVA tenderness.  Uterus palpable at size equal to dates.  No scars noted.. Fetal heart tones present.  MUSCULOSKELETAL:  Full range of motion  EXTREMITIES:  No edema. No significant varicosities.     PELVIC EXAM:  GENITALIA: EGBUS  External genitalia, Bartholin's glands, urethra & [place]'s glands:normal. Vulva reveals erythema and multiple raised lesions at opening of introitus, no vesicles noted.         VAGINA/CERVIX: exam deferred per pt preference              UTERUS: nontender 13 week size.   ADNEXA: exam deferred  RECTAL:  Normal appearance.  Digital exam deferred.  WET PREP: pH 3.6  GC/CHLAMYDIA CULTURE OBTAINED:YES    Lab Results   Component Value Date    PAP NIL 2019      ASSESSMENT:  26 year old , 13w1d weeks of  pregnancy with MOLLY of Sep 19, 2022 by US.  Genetic Screening: First Trimester Screen  Had a Quad screen at 11w which is no longer valid. May consider AFP after 15wks and level II us as scheduled.     BMI 38      ICD-10-CM    1. Abnormal quad screen  O28.0      PLAN:  - Reviewed use of triage nurse line and contacting the on-call provider after hours for an urgent need such as fever, vagina bleeding, bladder or vaginal infection, rupture of membranes,  or term labor.    - Reviewed best evidence for: weight gain for her weight and height for pregnancy:  Based on pre-pregnancy There is no height or weight on file to calculate BMI. RECOMMENDED WEIGHT GAIN: < 15 lbs.  If pre pregnancy BMI>/= 30, BMI entered on problem list   - Reviewed healthy diet and foods to avoid, exercise and activity during pregnancy; avoiding exposure to toxoplasmosis; and maintenance of a generally healthy lifestyle.   - Discussed the harms, benefits, side effects and alternative therapies for current prescribed and OTC medications.    - Reviewed high risk for gestational diabetes d/t Pre pregnancy BMI>30, IS agreeable to early 1 hour today.  - Reviewed high risk for pre term labor, IS NOTagreeable  to 17P.  Cervical length screening IS NOT indicated.  - Reviewed Moderate risk for Pre Eclampsia d/t No known risk factors of High risk for Pre E  or meets two or more of the moderate risk factors including Nulliparity  and Pre Pregnancy body mass index >30  and ISagreeable to starting 81mg aspirin daily after 12 weeks .    - All pt's and partner's questions discussed and answered.  Pt verbalized understanding of and agreement to plan of care.     - Plan to  ASA 81mg OTC  - Wet Prep POCT done today for vaginal itching, no yeast seen on microscopic examination but vaginal pH 3.6 so will treat with clotrimazole x7 days. If no improvement in symptoms following treatment instructed to RTC for examination prior to next prenatal visit.    -  Reviewed use of doppler at home for fetal heart tones is safe, however it is not necessary.   - At next visit: confirm use of daily ASA, discuss Hep B vaccine which was not discussed today    - Continue scheduled prenatal care and prn if questions or concerns    CARA Ryan CNM

## 2022-03-16 NOTE — NURSING NOTE
Chief Complaint   Patient presents with     Prenatal Care     DEMETRIUS 13 weeks and 1 day   Yennifer Huynh LPN

## 2022-03-16 NOTE — PROGRESS NOTES
"SUBJECTIVE:   Angie is a 26 year old female who presents to clinic for a new OB visit.   at 13w2d with Estimated Date of Delivery: Sep 19, 2022 based on US confirms. Feels well. Has started PNV.     She has had light pink spotting 2 weeks ago with wiping, no bright red bleeding since LMP.  She has not had nausea. Weight loss has not occurred.   This was a planned pregnancy.   Partner is involved, Abdias Agosto     OTHER CONCERNS:   - She is here today with her partner Abdias, they are very excited about this pregnancy!   - Having some itching and irritation of vagina, tried monistat last week which did not help. Reports she has used some baby powder and that has helped. Feels like her vagina has been \"moist\". Denies abnormal discharge or abnormal odor.   - Wondering if it is safe to use a doppler at home for fetal heart tones    - Reviewed change to her pregnancy dating based on recent ultrasound and MOLLY of 22    ===========================================  ROS: 10 point ROS neg other than the symptoms noted above in the HPI.    PSYCHIATRIC:  Denies mood changes.  No history.     PHQ-9 score:    PHQ-9 SCORE 2021   PHQ-9 Total Score MyChart 6 (Mild depression)   PHQ-9 Total Score 6       No flowsheet data found.    Past History:  Her past medical history No past medical history on file..   This is her first pregnancy  Since her last LMP she denies use of alcohol, tobacco and street drugs.    HISTORY:  Family History   Problem Relation Age of Onset     Asthma Mother      Obesity Mother         lapband surgery, surgical complication-bowel lac, septic shock-     Depression Sister         post partum     Depression Sister         post partum     Asthma Brother      No Known Problems Brother      Heart Disease Maternal Grandfather      Social History     Socioeconomic History     Marital status: Single     Spouse name: Capo     Number of children: Not on file     Years of education: Not on " file     Highest education level: Not on file   Occupational History     Occupation: [employer]     Comment: parttime   Tobacco Use     Smoking status: Former Smoker     Smokeless tobacco: Never Used     Tobacco comment: vaped 2021   Vaping Use     Vaping Use: Former     Quit date: 10/21/2021   Substance and Sexual Activity     Alcohol use: Not Currently     Drug use: Yes     Types: Marijuana     Sexual activity: Yes     Partners: Male   Other Topics Concern     Parent/sibling w/ CABG, MI or angioplasty before 65F 55M? Not Asked   Social History Narrative     Not on file     Social Determinants of Health     Financial Resource Strain: Not on file   Food Insecurity: Not on file   Transportation Needs: Not on file   Physical Activity: Not on file   Stress: Not on file   Social Connections: Not on file   Intimate Partner Violence: Not on file   Housing Stability: Not on file     Current Outpatient Medications   Medication Sig     ketoconazole (NIZORAL) 2 % external cream Apply topically 2 times daily (Patient not taking: Reported on 2022)     levonorgestrel-ethinyl estradiol (MARLISSA) 0.15-30 MG-MCG tablet Take 1 tablet by mouth daily (Patient not taking: Reported on 2022)     polyethylene glycol (MIRALAX/GLYCOLAX) powder Take 17 g (1 capful) by mouth daily (Patient not taking: Reported on 2022)     Prenatal Vit-Fe Fumarate-FA (PRENATAL MULTIVITAMIN  PLUS IRON) 27-1 MG TABS Take by mouth daily     No current facility-administered medications for this visit.     Allergies   Allergen Reactions     Penicillins Hives and Shortness Of Breath       ============================================  MEDICAL HISTORY  No past medical history on file.  Past Surgical History:   Procedure Laterality Date     PE TUBES         OB History    Para Term  AB Living   1 0 0 0 0 0   SAB IAB Ectopic Multiple Live Births   0 0 0 0 0      # Outcome Date GA Lbr Brody/2nd Weight Sex Delivery Anes PTL Lv   1 Current                   GYN History- No Abnormal Pap Smears                        Cervical procedures: no                        History of STI: none    I personally reviewed the past social/family/medical and surgical history on the date of service.   I reviewed lab work done at Intake visit with patient.    EXAM:  LMP 11/15/2021 (Within Weeks)    EXAM:  GENERAL:  Pleasant pregnant female, alert, cooperative and well groomed.  SKIN:  Warm and dry, without lesions or rashes  HEAD: Symmetrical features.  NECK:  Thyroid without enlargement and nodules.  Lymph nodes not palpable.   LUNGS:  Clear to auscultation.  BREAST:    No dominant, fixed or suspicious masses are noted.  No skin or nipple changes or axillary nodes.   Nipples everted.      HEART:  RRR without murmur.  ABDOMEN: Soft without masses , tenderness or organomegaly.  No CVA tenderness.  Uterus palpable at size equal to dates.  No scars noted.. Fetal heart tones present.  MUSCULOSKELETAL:  Full range of motion  EXTREMITIES:  No edema. No significant varicosities.     PELVIC EXAM:  GENITALIA: EGBUS  External genitalia, Bartholin's glands, urethra & [place]'s glands:normal. Vulva reveals erythema and multiple raised lesions at opening of introitus, no vesicles noted.         VAGINA/CERVIX: exam deferred per pt preference              UTERUS: nontender 13 week size.   ADNEXA: exam deferred  RECTAL:  Normal appearance.  Digital exam deferred.  WET PREP: pH 3.6  GC/CHLAMYDIA CULTURE OBTAINED:YES    Lab Results   Component Value Date    PAP NIL 2019      ASSESSMENT:  26 year old , 13w1d weeks of pregnancy with MOLLY of Sep 19, 2022 by US.  Genetic Screening: First Trimester Screen  Had a Quad screen at 11w which is no longer valid. May consider AFP after 15wks and level II us as scheduled.     BMI 38      ICD-10-CM    1. Abnormal quad screen  O28.0      PLAN:  - Reviewed use of triage nurse line and contacting the on-call provider after hours for an urgent need  such as fever, vagina bleeding, bladder or vaginal infection, rupture of membranes,  or term labor.    - Reviewed best evidence for: weight gain for her weight and height for pregnancy:  Based on pre-pregnancy There is no height or weight on file to calculate BMI. RECOMMENDED WEIGHT GAIN: < 15 lbs.  If pre pregnancy BMI>/= 30, BMI entered on problem list   - Reviewed healthy diet and foods to avoid, exercise and activity during pregnancy; avoiding exposure to toxoplasmosis; and maintenance of a generally healthy lifestyle.   - Discussed the harms, benefits, side effects and alternative therapies for current prescribed and OTC medications.    - Reviewed high risk for gestational diabetes d/t Pre pregnancy BMI>30, IS agreeable to early 1 hour today.  - Reviewed high risk for pre term labor, IS NOTagreeable  to 17P.  Cervical length screening IS NOT indicated.  - Reviewed Moderate risk for Pre Eclampsia d/t No known risk factors of High risk for Pre E  or meets two or more of the moderate risk factors including Nulliparity  and Pre Pregnancy body mass index >30  and ISagreeable to starting 81mg aspirin daily after 12 weeks .    - All pt's and partner's questions discussed and answered.  Pt verbalized understanding of and agreement to plan of care.     - Plan to  ASA 81mg OTC  - Wet Prep POCT done today for vaginal itching, no yeast seen on microscopic examination but vaginal pH 3.6 so will treat with clotrimazole x7 days. If no improvement in symptoms following treatment instructed to RTC for examination prior to next prenatal visit.    - Reviewed use of doppler at home for fetal heart tones is safe, however it is not necessary.   - At next visit: confirm use of daily ASA, discuss Hep B vaccine which was not discussed today    - Continue scheduled prenatal care and prn if questions or concerns    CARA Ryan CNM

## 2022-03-17 LAB
C TRACH DNA SPEC QL NAA+PROBE: NEGATIVE
CLUE CELLS: NEGATIVE
N GONORRHOEA DNA SPEC QL NAA+PROBE: NEGATIVE
TRICHOMONAS (WET PREP): NEGATIVE
WBC (WET PREP): NEGATIVE
YEAST (WET PREP): NEGATIVE

## 2022-03-17 RX ORDER — ASPIRIN 81 MG/1
81 TABLET, CHEWABLE ORAL DAILY
COMMUNITY
End: 2024-01-16

## 2022-03-18 PROBLEM — O28.0 ABNORMAL QUAD SCREEN: Status: RESOLVED | Noted: 2022-03-07 | Resolved: 2022-03-18

## 2022-03-28 ENCOUNTER — OFFICE VISIT (OUTPATIENT)
Dept: OBGYN | Facility: CLINIC | Age: 27
End: 2022-03-28
Attending: ADVANCED PRACTICE MIDWIFE
Payer: COMMERCIAL

## 2022-03-28 VITALS
HEIGHT: 59 IN | HEART RATE: 73 BPM | WEIGHT: 199.5 LBS | DIASTOLIC BLOOD PRESSURE: 76 MMHG | BODY MASS INDEX: 40.22 KG/M2 | SYSTOLIC BLOOD PRESSURE: 121 MMHG

## 2022-03-28 DIAGNOSIS — O09.92 HRP (HIGH RISK PREGNANCY), SECOND TRIMESTER: Primary | ICD-10-CM

## 2022-03-28 DIAGNOSIS — Z78.9 NOT IMMUNE TO HEPATITIS B VIRUS: ICD-10-CM

## 2022-03-28 DIAGNOSIS — A63.0 GENITAL WARTS: ICD-10-CM

## 2022-03-28 PROCEDURE — 99207 PR PRENATAL VISIT: CPT | Performed by: ADVANCED PRACTICE MIDWIFE

## 2022-03-28 PROCEDURE — 99202 OFFICE O/P NEW SF 15 MIN: CPT | Performed by: ADVANCED PRACTICE MIDWIFE

## 2022-03-28 PROCEDURE — G0463 HOSPITAL OUTPT CLINIC VISIT: HCPCS

## 2022-03-28 ASSESSMENT — PAIN SCALES - GENERAL: PAINLEVEL: NO PAIN (0)

## 2022-03-28 NOTE — PROGRESS NOTES
"Subjective:      26 year old  at 15w0d presents for a routine prenatal appointment.       no vaginal bleeding or leakage of fluid.  no contractions or cramping.    unable to feel fetal movement yet.    No HA, visual changes, RUQ or epigastric pain.     The patient presents with the following concerns:     - She has had restless legs for the past month, noticed at night. Causes impaired sleep. Stretching can help briefly, walking can help. Warm baths have not been helpful. She has not tried any medications, has not been using antihistamines.    -She continues to have vulvar itching. She reports itching is external, she believes it is related to \"bumps\" on her labia and near her clitoris. The bumps were first noticed approximately 2 weeks ago. She reports the bumps are irritating, cause itching. She has not had these bumps prior to this episode. No discharge, no odor. Some pressure during intercourse that is relieved with position change, otherwise no dyspareunia. She thought the clotrimazole prescribed at the last visit helped, though did not resolve the itching. She noticed more bumps since the last visit.     -She had left-sided abdominal pain the other day that resolved spontaneously. She has noticed mild, bilateral, lower extremity edema.    Level II US Scheduled for  .      Objective:  Vitals:    22 0953   BP: 121/76   Pulse: 73   Weight: 90.5 kg (199 lb 8 oz)   Height: 1.499 m (4' 11\")     See OB flowsheet    Exam:  Vulva: Multiple erythematous and flesh-toned papules to labia and perianal region. No vesicles noted. Consistent with HPV lesions    Assessment/Plan  (O09.92) HRP (high risk pregnancy), second trimester  (primary encounter diagnosis)  Comment: +FHT, routine supervision at this time  Plan: Continued monitoring, plan to follow up in 4 weeks. Complete level II ultrasound as scheduled. Hep B vaccination discussed, information provided. She will consider having this completed, would like to " discuss with partner first.    Genital Warts  Discussed new diagnosis with patient and education provided.  Discussed treatment today, patient consents.   Comment: Multiple papules to vulva, no changes to soaps, detergents, lotions. She has not noticed lesions on partner. Lesions appear consistent with genital warts.   Plan: Treated with TCA. Informed her repeat treatments may be needed. Topical lidocaine provided            Encounter Diagnosis   Name Primary?     HRP (high risk pregnancy), second trimester Yes       - Reviewed total weight gain, encouraged continued healthy diet and exercise.      - Reviewed why/how to contact provider.    Patient education/orders or handouts today:  level 2 u/s scheduled, hep b vaccination, information regarding genital warts, recommend HPV vaccinations postpartum     Return to clinic in 4 weeks and prn if questions or concerns.     I, Yokasta SALAZAR student, am serving as a scribe; to document services personally performed by  CARA Bloom, PAULA based on data collection and the provider's statements to me.     I agree with the PFSH and ROS as completed by Yokasta SALAZAR student except for changes made by me. The remainder of the encounter was performed by me and scribed by Yokasta SALAZAR student. The scribed note accurately reflects my personal services and decisions made by me.   CARA Contreras CNM

## 2022-03-28 NOTE — LETTER
"3/28/2022       RE: Angie Reynolds  [address]  [facility] [address]     Dear Colleague,    Thank you for referring your patient, Angie Reynolds, to the [facility] [place] at [facility]. Please see a copy of my visit note below.    Subjective:      26 year old  at 15w0d presents for a routine prenatal appointment.       no vaginal bleeding or leakage of fluid.  no contractions or cramping.    unable to feel fetal movement yet.    No HA, visual changes, RUQ or epigastric pain.     The patient presents with the following concerns:     - She has had restless legs for the past month, noticed at night. Causes impaired sleep. Stretching can help briefly, walking can help. Warm baths have not been helpful. She has not tried any medications, has not been using antihistamines.    -She continues to have vulvar itching. She reports itching is external, she believes it is related to \"bumps\" on her labia and near her clitoris. The bumps were first noticed approximately 2 weeks ago. She reports the bumps are irritating, cause itching. She has not had these bumps prior to this episode. No discharge, no odor. Some pressure during intercourse that is relieved with position change, otherwise no dyspareunia. She thought the clotrimazole prescribed at the last visit helped, though did not resolve the itching. She noticed more bumps since the last visit.     -She had left-sided abdominal pain the other day that resolved spontaneously. She has noticed mild, bilateral, lower extremity edema.    Level II US Scheduled for  .      Objective:  Vitals:    22 0953   BP: 121/76   Pulse: 73   Weight: 90.5 kg (199 lb 8 oz)   Height: 1.499 m (4' 11\")     See OB flowsheet    Exam:  Vulva: Multiple erythematous and flesh-toned papules to labia and perianal region. No vesicles noted. Consistent with HPV lesions    Assessment/Plan  (O09.92) " HRP (high risk pregnancy), second trimester  (primary encounter diagnosis)  Comment: +FHT, routine supervision at this time  Plan: Continued monitoring, plan to follow up in 4 weeks. Complete level II ultrasound as scheduled. Hep B vaccination discussed, information provided. She will consider having this completed, would like to discuss with partner first.    Genital Warts  Discussed new diagnosis with patient and education provided.  Discussed treatment today, patient consents.   Comment: Multiple papules to vulva, no changes to soaps, detergents, lotions. She has not noticed lesions on partner. Lesions appear consistent with genital warts.   Plan: Treated with TCA. Informed her repeat treatments may be needed. Topical lidocaine provided            Encounter Diagnosis   Name Primary?     HRP (high risk pregnancy), second trimester Yes       - Reviewed total weight gain, encouraged continued healthy diet and exercise.      - Reviewed why/how to contact provider.    Patient education/orders or handouts today:  level 2 u/s scheduled, hep b vaccination, information regarding genital warts, recommend HPV vaccinations postpartum     Return to clinic in 4 weeks and prn if questions or concerns.     I, Yokasta SALAZAR student, am serving as a scribe; to document services personally performed by  CARA Bloom, PAULA based on data collection and the provider's statements to me.     I agree with the PFSH and ROS as completed by Yokasta SALAZAR student except for changes made by me. The remainder of the encounter was performed by me and scribed by Yokasta SALAZAR student. The scribed note accurately reflects my personal services and decisions made by me.   CARA Contreras CNM

## 2022-03-28 NOTE — PATIENT INSTRUCTIONS
Patient Education     What Is Genital HPV?  HPV (human papillomavirus) is a very common family of viruses. Some types (strains) of genital HPV can cause abnormal changes (dysplasia) in the cells of a woman s cervix, or in a woman's or man's anus. In a small number of women, these cervical changes can lead to cancer if not treated. Some people also develop anal cancer. Also, certain strains of HPV can cause genital warts. Many people carry HPV. But it often has no symptoms. The virus may first be found when signs of dysplasia or warts are found during a Pap test.      Dysplasia develops when cervical cells change in ways that are not normal.         Warts on the cervix can be detected with a Pap test.         Warts around the genitals may be visible. These external warts can affect the vulva, vagina, and anus.      How does HPV spread?  HPV lives inside skin and mucous membranes. It spreads when skin carrying the virus touches other skin. Genital HPV most often spreads during sexual contact. Condoms and other barriers help protect against the spread by preventing skin contact. But condoms may not cover all affected skin. So they may not provide complete protection. There is no cure for HPV. Even if symptoms go away, the virus may stay in the body. It often doesn t cause symptoms. So many people who have HPV don t even know it.   When dysplasia occurs  The cervix is the narrow canal at the bottom of the uterus. It s made up of layers of cells that normally change as they grow. Dysplasia occurs when HPV causes some cervical cells to change in abnormal ways. These abnormal cells can be found with a Pap test. Left untreated, abnormal cells can develop into cancer. A similar process can occur in the anus if there is HPV present.   When warts form  Certain strains of HPV can make skin cells reproduce more often than they should. These extra skin cells build up into warts. Warts can form on the cervix. They can also form  around or inside the genitals and anus, and sometimes in the mouth. Treating warts helps keep HPV from spreading to sex partners.   HPV vaccine  A vaccine is available that protects against certain strains of HPV. It's strongly advised that all [place] teens and young adults get the HPV vaccine. It's recommended at ages 11 to 12. But sometimes it can be given as young as age 9. A catch-up dose may be given up to age 26 for adults who were not vaccinated as a teen. It can sometimes be given from ages 27 to 45. Talk with your healthcare provider to learn more.   [employer] last reviewed this educational content on 6/1/2019 2000-2021 [employer]. All rights reserved. This information is not intended as a substitute for professional medical care. Always follow your healthcare professional's instructions.           Patient Education     Genital Warts (Condyloma)     Ask your healthcare provider about the HPV vaccine.   Genital warts (condyloma) are caused by a virus that is often spread sexually. This virus is known as HPV (human papillomavirus). The warts are sometimes tiny or in parts of the body that are hard to see. But genital warts can cause big trouble, especially in women. HPV infection can cause cell changes that can lead to cervical, penile, or anal cancer. Warts can even be passed to babies during childbirth.   Female and male latex condoms may help protect against genital warts. But condoms don t cover all the areas that can get infected. That means condoms may not protect you completely.   What are the symptoms of genital warts?  Genital warts can be flat. Or they can be raised and look like tiny cauliflowers. They can grow on the penis, vagina, or cervix. They can also grow in and around the rectum, and even in the throat. You can have the virus for many months or even years before the warts appear. Or you may have the virus but never get visible warts. Once warts form, they might be too small  to be seen. That s why you need regular exams by your healthcare provider. He or she can find tiny warts and test for HPV infection. Your provider can also check your cells for changes that also show that the virus is present.   What is the treatment of genital warts?  Genital warts tend to grow with time. The smaller the warts are, the easier they are to remove. So don t delay. Warts are most often removed with medicine. Sometimes they re frozen off with liquid nitrogen. Warts may also be removed with heat, laser, or surgery. More than 1 treatment may be needed. Never try to treat genital warts yourself. Often warts need to be treated a few times as they tend to come back.   How are genital warts prevented?  To prevent genital warts, get vaccinated against HPV. The HPV vaccine is advised for all girls and boys during the early teen years. The vaccine is especially helpful if given early and before starting to have sex. But it can be effective even if given later. It's approved for use up to age 45.   It's also important to know your partner s sexual history. Someone may not have visible warts. But he or she can still spread the virus. Protect yourself by using latex condoms. And get regular health exams. In women, regular Pap smears with HPV testing can help find changes caused by the virus and catch any early signs of cervical cancer.   For more information  [employer] STD Hotline, 667.835.1532, www.ashastd.org  CDC, 495.181.1841, www.cdc.gov/std  David last reviewed this educational content on 6/1/2019 2000-2021 [employer]. All rights reserved. This information is not intended as a substitute for professional medical care. Always follow your healthcare professional's instructions.           Patient Education     Treating Genital Warts  Genital warts sometimes go away on their own, stay unchanged, or grow in size and number. Depending on where the warts are, some  treatments may work better than others. These treatments may remove the wart. But the virus that caused the wart often stays in the skin. And there is a chance of the warts returning.  Vaccine information  There is a vaccine that can prevent HPV and HPV-linked cancers. It is given to both men and women. To work best, the vaccine should be given at or before age 11 or 12, before the first possible exposure to the virus. Ask your healthcare provider about it if you haven't had it. The vaccine is approved by the FDA up to age 45.  Medicines     Medicines can be applied to break warts apart.     Prescription creams and gels can be applied to warts and nearby skin. Some prompt your immune system to fight against HPV (human papillomavirus), the virus that causes genital warts. Others are strong substances that destroy warts. Medicines can be applied at the healthcare provider's office or at home. Often more than 1 dose is needed. These treatments sometimes cause skin rashes. Talk with your healthcare provider about possible side effects.  Wart removal     Warts may be removed using a heated wire loop.     Warts can be removed in many ways. These include freezing, heat (cautery), lasers, and surgery. These procedures are done by your regular healthcare provider or a specialist. Before treatment, you may be given local anesthesia to numb the area. The number of treatments depends on how many warts are being removed. Your healthcare provider can give you more details.  David last reviewed this educational content on 6/1/2019 2000-2021 [employer]. All rights reserved. This information is not intended as a substitute for professional medical care. Always follow your healthcare professional's instructions.           Patient Education     Hepatitis A Vaccine , Hepatitis B Vaccine Suspension for injection  What is this medicine?  HEPATITIS A VACCINE; HEPATITIS B VACCINE (hep uh CLIF osei SEEN; hep   CLIF osei SEEN) is a vaccine to protect from an infection with the hepatitis A and B virus. This vaccine does not contain the live viruses. It will not cause a hepatitis infection.  This medicine may be used for other purposes; ask your health care provider or pharmacist if you have questions.  What should I tell my health care provider before I take this medicine?  They need to know if you have any of these conditions:    bleeding disorder    fever or infection    heart disease    immune system problems    an unusual or allergic reaction to hepatitis A or B vaccine, neomycin, yeast, thimerosal, other medicines, foods, dyes, or preservatives    pregnant or trying to get pregnant    breast-feeding  How should I use this medicine?  This vaccine is for injection into a muscle. It is given by a health care professional.  A copy of Vaccine Information Statements will be given before each vaccination. Read this sheet carefully each time. The sheet may change frequently.  Talk to your pediatrician regarding the use of this medicine in children. Special care may be needed.  Overdosage: If you think you have taken too much of this medicine contact a poison control center or emergency room at once.  NOTE: This medicine is only for you. Do not share this medicine with others.  What if I miss a dose?  It is important not to miss your dose. Call your doctor or health care professional if you are unable to keep an appointment.  What may interact with this medicine?    medicines that suppress your immune function like adalimumab, anakinra, infliximab    medicines to treat cancer    steroid medicines like prednisone or cortisone  This list may not describe all possible interactions. Give your health care provider a list of all the medicines, herbs, non-prescription drugs, or dietary supplements you use. Also tell them if you smoke, drink alcohol, or use illegal drugs. Some items may interact with your medicine.  What should I  watch for while using this medicine?  See your health care provider for all shots of this vaccine as directed. You must have 3 to 4 shots of this vaccine for protection from hepatitis A and B infection. Tell your doctor right away if you have any serious or unusual side effects after getting this vaccine.  What side effects may I notice from receiving this medicine?  Side effects that you should report to your doctor or health care professional as soon as possible:    allergic reactions like skin rash, itching or hives, swelling of the face, lips, or tongue    breathing problems    confused, irritated    fast, irregular heartbeat    flu-like syndrome    numb, tingling pain    seizures  Side effects that usually do not require medical attention (report to your doctor or health care professional if they continue or are bothersome):    diarrhea    fever    headache    loss of appetite    muscle pain    nausea    pain, redness, swelling, or irritation at site where injected    tiredness  This list may not describe all possible side effects. Call your doctor for medical advice about side effects. You may report side effects to FDA at 9-125-OZB-9128.  Where should I keep my medicine?  This drug is given in a hospital or clinic and will not be stored at home.  NOTE:This sheet is a summary. It may not cover all possible information. If you have questions about this medicine, talk to your doctor, pharmacist, or health care provider. Copyright  2016 [employer]           Patient Education     Hepatitis B Immune Globulin Solution for injection  What is this medicine?  HEPATITIS B IMMUNE GLOBULIN (hep uh TAHY tis B im GISEL hernandez) is used to prevent infections of hepatitis B after close contact with someone who has the infection.  This medicine may be used for other purposes; ask your health care provider or pharmacist if you have questions.  What should I tell my health care provider before I take this medicine?  They  need to know if you have any of these conditions:    bleeding disorder    low levels of immunoglobulin A in the body    low levels of platelets in the blood    an unusual or allergic reaction to human immune globulin, other medicines, foods, dyes, or preservatives    pregnant or trying to get pregnant    breast-feeding  How should I use this medicine?  This medicine is for infusion into a muscle. It is given by a health care professional in a hospital or clinic setting.  Talk to your pediatrician regarding the use of this medicine in children. While this drug may be prescribed for children as young as  for selected conditions, precautions do apply.  Overdosage: If you think you have taken too much of this medicine contact a poison control center or emergency room at once.  NOTE: This medicine is only for you. Do not share this medicine with others.  What if I miss a dose?  This does not apply.  What may interact with this medicine?    live virus vaccines  This list may not describe all possible interactions. Give your health care provider a list of all the medicines, herbs, non-prescription drugs, or dietary supplements you use. Also tell them if you smoke, drink alcohol, or use illegal drugs. Some items may interact with your medicine.  What should I watch for while using this medicine?  This medicine is made from human blood. It may be possible to pass an infection in this medicine. Talk to your doctor about the risks and benefits of this medicine.  This medicine may interfere with live virus vaccines. Before you get other live virus vaccines tell your health care professional if you have received this medicine within the past 3 months.  What side effects may I notice from receiving this medicine?  Side effects that you should report to your doctor or health care professional as soon as possible:    allergic reactions like skin rash, itching or hives, swelling of the face, lips, or tongue    breathing  problems    chest pain or tightness  Side effects that usually do not require medical attention (report to your doctor or health care professional if they continue or are bothersome):    pain and tenderness at site where injected  This list may not describe all possible side effects. Call your doctor for medical advice about side effects. You may report side effects to FDA at 6-311-CMR-1823.  Where should I keep my medicine?  This drug is given in a hospital or clinic and will not be stored at home.  NOTE:This sheet is a summary. It may not cover all possible information. If you have questions about this medicine, talk to your doctor, pharmacist, or health care provider. Copyright  2016 [employer]

## 2022-04-25 ENCOUNTER — HOSPITAL ENCOUNTER (OUTPATIENT)
Dept: ULTRASOUND IMAGING | Facility: CLINIC | Age: 27
Discharge: HOME | End: 2022-04-25
Attending: OBSTETRICS & GYNECOLOGY
Payer: COMMERCIAL

## 2022-04-25 ENCOUNTER — OFFICE VISIT (OUTPATIENT)
Dept: MATERNAL FETAL MEDICINE | Facility: CLINIC | Age: 27
End: 2022-04-25
Attending: OBSTETRICS & GYNECOLOGY
Payer: COMMERCIAL

## 2022-04-25 DIAGNOSIS — O99.212 MATERNAL OBESITY SYNDROME, SECOND TRIMESTER: Primary | ICD-10-CM

## 2022-04-25 DIAGNOSIS — O26.90 PREGNANCY RELATED CONDITION, ANTEPARTUM: ICD-10-CM

## 2022-04-25 PROCEDURE — 76811 OB US DETAILED SNGL FETUS: CPT

## 2022-04-25 PROCEDURE — 76811 OB US DETAILED SNGL FETUS: CPT | Mod: 26 | Performed by: OBSTETRICS & GYNECOLOGY

## 2022-04-25 NOTE — PROGRESS NOTES
Please see full imaging report from [employer] program under imaging tab.    Barbi Figueroa MD  Maternal Fetal Medicine

## 2022-05-10 ENCOUNTER — TELEPHONE (OUTPATIENT)
Dept: FAMILY MEDICINE | Facility: CLINIC | Age: 27
End: 2022-05-10

## 2022-05-10 ENCOUNTER — HOSPITAL ENCOUNTER (OUTPATIENT)
Dept: BEHAVIORAL HEALTH | Facility: CLINIC | Age: 27
Discharge: HOME | End: 2022-05-10
Attending: FAMILY MEDICINE | Admitting: FAMILY MEDICINE
Payer: COMMERCIAL

## 2022-05-10 VITALS — HEIGHT: 59 IN | WEIGHT: 180 LBS | BODY MASS INDEX: 36.29 KG/M2

## 2022-05-10 PROCEDURE — H0001 ALCOHOL AND/OR DRUG ASSESS: HCPCS | Mod: GT,95

## 2022-05-10 ASSESSMENT — COLUMBIA-SUICIDE SEVERITY RATING SCALE - C-SSRS
TOTAL  NUMBER OF INTERRUPTED ATTEMPTS LIFETIME: NO
ATTEMPT LIFETIME: NO
TOTAL  NUMBER OF ABORTED OR SELF INTERRUPTED ATTEMPTS LIFETIME: NO
6. HAVE YOU EVER DONE ANYTHING, STARTED TO DO ANYTHING, OR PREPARED TO DO ANYTHING TO END YOUR LIFE?: NO
1. HAVE YOU WISHED YOU WERE DEAD OR WISHED YOU COULD GO TO SLEEP AND NOT WAKE UP?: NO
2. HAVE YOU ACTUALLY HAD ANY THOUGHTS OF KILLING YOURSELF?: NO

## 2022-05-10 ASSESSMENT — PAIN SCALES - GENERAL: PAINLEVEL: NO PAIN (0)

## 2022-05-10 NOTE — TELEPHONE ENCOUNTER
LMTCB. Please ask patient if she plans on switching to see Dr. Gomez for OB? Pt is scheduled 5/12/22 11:10 AM with Dr. Gomez for routine OB visit. Looks like patient is currently being seen at [facility].

## 2022-05-10 NOTE — PROGRESS NOTES
[facility]  Provider Name:  CITLALY Singh/[facility]     Telephone: [phone]      PATIENT'S NAME: Angie Reynolds  PREFERRED NAME: Angie  PRONOUNS: she/her/hers    MRN: [id number]  : 1995   ACCT. NUMBER:  [id number]  DATE OF SERVICE: May 10, 2022  START TIME: 12:55 pm  END TIME: 1:41 pm  E-MAIL: [email or web address]   PREFERRED PHONE: [phone]   May we leave a program related message: Yes  ADDRESS:   [address] [place] [address] [address]  [facility] [address]  SERVICE MODALITY:  Video Visit:    Provider verified identity through the following two step process.  Patient provided:  Patient  (1995) and Patient's last 4 digits of N ([phone])    Telemedicine Visit: The patient's condition can be safely assessed and treated via synchronous audio and visual telemedicine encounter.      Reason for Telemedicine Visit: Services only offered telehealth    Originating Site (Patient Location): Patient's home    Distant Site (Provider Location): Provider Remote Setting    Consent:  The patient/guardian has verbally consented to: the potential risks and benefits of telemedicine (video visit) versus in person care; bill my insurance or make self-payment for services provided; and responsibility for payment of non-covered services.     Patient would like the video invitation sent by:  My Chart    Mode of Communication:  Video Conference via [facility]    EMERGENCY CONTACT:  Guille Sosa (grandmother)  Tel #: [phone]    UNIVERSAL ADULT SUBSTANCE USE DISORDER DIAGNOSTIC ASSESSMENT    Identifying Information:  The patient is a 26 year old,  female with no [place] affiliation.  The pronoun use throughout this assessment reflects the patient's chosen pronoun.  The patient was referred for an assessment by [facility] .  The patient attended the session alone.     Chief Complaint:   The patient had an assessment via a  remote video visit at [facility] on 5/10/2022 with this counselor for evaluation of a possible substance use disorder.  The reason for the substance use disorder assessment was due to pressure from the legal system.  The precipitating event was the patient arrested for a misdemeanor DUI charge in [facility] on 3/14/2021, when she reported being under the influence of THC and denied having any use of alcohol.  The patient also reported having a history of several charges for driving without a 's license, but she denied having any other history of arrests or legal charges.  The patient appeared to be willing to continue to abstain from all non-prescribed mood altering chemicals and to attend and complete a minimum of a Level I Driving with Care 12-hour DWI class.  The patient first had a concern about having substance abuse issues after receiving her DUI charge.  The patient denied having any inpatient detoxification admissions or inpatient hospitalizations for withdrawal symptoms.  The patient denied having any history of participating in a substance use disorder treatment program.  The patient denied having any history of attending 12-step or other support group meetings.  The patient reported she had stopped her use of cannabis on her own in 12/2022 secondary to starting on court probation for her DUI charge.  The patient does not appear to be in severe withdrawal, an imminent safety risk to self or others, or requiring immediate medical attention and may proceed with the assessment interview.    Social/Family History:  The patient reported growing up in [place], [place].  The patient reported being raised by her mother.  The patient reported discipline in her family home was in the form of being grounded or verbal reprimands.  The patient denied experiencing or witnessing any verbal, physical or sexual abuse in the family home.  The patient reported feeling supported by her mother, her grandmother  and her siblings when she was growing up.  The patient reported being raised in no Zoroastrianism.  The patient reported a history of:   Family History   Problem Relation Age of Onset     Asthma Mother      Obesity Mother         lapband surgery, surgical complication-bowel lac, septic shock-     Substance Abuse Father      Heart Disease Maternal Grandfather      Substance Abuse Brother      Asthma Brother      No Known Problems Brother      Depression Sister         post partum     Depression Sister         post partum   The patient reported overall her childhood was happy.  The patient described current relationships with her family of origin as being good.      The patient describes her cultural background as being an  female with no [place] affiliation.  Cultural influences and impact on patient's life structure, values, norms, and healthcare: The patient denied cultural concerns had an impact on life structure, values, norms, or healthcare.  Contextual influences on patient's health include: Family Factors: The patient reported her father and her brother had a history of substance abuse.  The patient identified her preferred language to be English.  The patient reported she does not need the assistance of an  or other support involved in therapy.  The patient reported she is rarely involved in community of regulo activities, including attending Zoroastrianism and going to [employer].  The patient reported her spirituality would likely have no impact on her recovery.    The patient reported having no significant delays in developmental tasks.  The patient's highest education level was high school graduate.  The patient identified the following learning problems: none reported.  The patient reports she is able to understand written materials.    The patient reported she had never been .  The patient identified as being heterosexual and she reported being in a serious romantic relationship  with her boyfriend for the past 7-8 months.  The patient denied having any previous children, but she is currently pregnant with a due date of 9/19/2022.     The patient reported living with her sister in a single family home.  The patient denied having any concerns regarding her immediate living environment and/or neighborhood and she plans to continue to live there.  The patient identified her boyfriend, her grandmother and her sister as being her primary support network at this time.  The patient identified the quality of her relationships with her support network as being good.  The patient would like the following people involved in treatment services if recommended: None at this time.     The patient reported engaging in the following recreational/leisure activities: spending time at home with her family and cooking meals.  The patient reported engaging in the following recreation/leisure activities while using alcohol or other non-prescribed mood altering chemicals: The patient reported she would smoke THC with her friends and alone in the past when she was smoking THC.  The patient reported the following people are supportive of her recovery: her boyfriend, her grandmother and her sister.    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  The patient reported her income is obtained through [facility] [employer] and [employer].  The patient reported having financial stressors at this time, including having only minimal income at this time, money being tight at this time and being behind on some of her bills.    The patient reported the following substance related arrests or legal issues: The patient received a misdemeanor DUI charge in [facility] on 3/14/2021, when she reported being under the influence of THC and denied having any use of alcohol.  The patient also reported having a history of several charges for driving without a 's  license, but she denied having any other history of arrests or legal charges.  The patient reported being on court probation in [facility] for her misdemeanor DUI charge at this time.    Patient's Strengths and Limitations:  The patient identified the following strengths or resources that will help her succeed in treatment: commitment to health and well being, family support and motivation.  Things that may interfere with the patient's success in treatment include: none identified.     Assessments:  The following assessments were completed by patient for this visit:  PHQ2:   PHQ-2 ( 1999 [employer]) 5/10/2022 1/21/2022 12/8/2021 3/1/2019   Q1: Little interest or pleasure in doing things 0 1 3 0   Q2: Feeling down, depressed or hopeless 0 0 1 0   PHQ-2 Score 0 1 4 0   PHQ-2 Total Score (12-17 Years)- Positive if 3 or more points; Administer PHQ-A if positive - - - 0   Q1: Little interest or pleasure in doing things Not at all - Nearly every day Not at all   Q2: Feeling down, depressed or hopeless Not at all - Several days Not at all   PHQ-2 Score 0 - 4 0     GAD2:   PARRIS-2 5/10/2022   Feeling nervous, anxious, or on edge 0   Not being able to stop or control worrying 0   PARRIS-2 Total Score 0     [place] Suicide Severity Rating Scale (Lifetime/Recent)  [place] Suicide Severity Rating (Lifetime/Recent) 5/10/2022   1. Wish to be Dead (Lifetime) 0   2. Non-Specific Active Suicidal Thoughts (Lifetime) 0   Actual Attempt (Lifetime) 0   Has subject engaged in non-suicidal self-injurious behavior? (Lifetime) 0   Interrupted Attempts (Lifetime) 0   Aborted or Self-Interrupted Attempt (Lifetime) 0   Preparatory Acts or Behavior (Lifetime) 0   Calculated C-SSRS Risk Score (Lifetime/Recent) No Risk Indicated     Personal and Family Medical History:  The patient did report a family history of mental health concerns.    Family History   Problem Relation Age of Onset     Asthma Mother      Obesity Mother         lapband surgery,  surgical complication-bowel lac, septic shock-     Substance Abuse Father      Heart Disease Maternal Grandfather      Substance Abuse Brother      Asthma Brother      No Known Problems Brother      Depression Sister         post partum     Depression Sister         post partum      The patient reported the following previous mental health diagnoses: The patient denied having any history of being diagnosed with a clinical mental health disorder.   The patient reported her primary mental health symptoms include: sleep problems and attentional problems and these do not impact her ability to function.  The patient has not received mental health services in the past: The patient denied having any history of being prescribed psychotropic medications.  The patient denied having any history of working with a 1:1 mental health therapist.  Psychiatric Hospitalizations: None.  The patient denies a history of civil commitment.  Current mental health services/providers include:  The patient denied having any history of being prescribed psychotropic medications.  The patient denied having any history of working with a 1:1 mental health therapist.    GAIN-SS Tool:      When was the last time that you had significant problems...  a. with feeling very trapped, lonely, sad, blue, depressed or hopeless about the future? 1+ years ago  b. with sleep trouble, such as bad dreams, sleeping restlessly, or falling asleep during the day? Past Month  c. with feeling very anxious, nervous, tense, scared, panicked or like something bad was going to happen?  Never  d. with becoming very distressed and upset when something reminded you of the past?  2 - 12 months ago  e. with thinking about ending your life or committing suicide?  Never  When was the last time that you did the following things two or more times?  a. Lied or conned to get things you wanted or to avoid having to do something?   Never  b. Had a hard time paying attention at  school, work or home? Past Month  c. Had a hard time listening to instructions at school, work or home?  Past Month  d. Were a bully or threatened other people?  Never  e. Started physical fights with other people?  Never    The patient has had a physical exam to rule out medical causes for current symptoms.  Date of last physical exam was within the past year. The patient was encouraged to follow up with PCP if symptoms were to develop.  The patient has a [facility] Provider, who is named Barrington Sotelo.  The patient reported the following medical concerns:   Past Medical History:   Diagnosis Date     Pregnancy test positive    The patient reported taking her medications as prescribed and following the recommendations of her healthcare providers.  The patient denied having any current issues with pain.  Patient is pregnant and is currently receiving prenatal care.  She reports there are not concerns for the health of her unborn baby.  There are not significant appetite / nutritional concerns / weight changes.  The patient does not report having a history of an eating disorder.  The patient does not report a history of head injury / trauma / cognitive impairment.      The patient reported current medications as:   Outpatient Medications Marked as Taking for the 5/10/22 encounter (Hospital Encounter) with [facility]   Medication Sig     Prenatal Vit-Fe Fumarate-FA (PRENATAL MULTIVITAMIN  PLUS IRON) 27-1 MG TABS Take by mouth daily     Medication Adherence:  The patient reported taking her prescribed medications as prescribed.    Patient Allergies:    Allergies   Allergen Reactions     Penicillins Hives and Shortness Of Breath     Medical History:    Past Medical History:   Diagnosis Date     Pregnancy test positive      Substance Use:  The patient reported the following biological family members or relatives with chemical health issues: The patient reported her father and her brother had a  history of substance abuse.  The patient denied having any inpatient detoxification admissions or inpatient hospitalizations for withdrawal symptoms.  The patient denied having any history of participating in a substance use disorder treatment program.  The patient denied having any history of attending 12-step or other support group meetings.  The patient denied having any history of participating in gambling treatment.  The patient is not currently receiving any chemical dependency treatment.              X = Primary Drug Used   Age of First Use Most Recent Pattern of Use and Duration   Need enough information to show pattern (both frequency and amounts) and to show tolerance for each chemical that has a diagnosis   Date of last use and time, if needed   Withdrawal Potential? Requiring special care Method of use  (oral, smoked, snort, IV, etc)      Alcohol     20 The patient reported her heaviest use of alcohol had been at age 23, when she reported a pattern of drinking 4 shots of hard alcohol 1-2 times per month.   1/10/2020 None Oral      Marijuana/  Hashish   19 The patient reported her heaviest use of THC had been between the ages of 19 and 20, when she reported a pattern of smoking 2-3 bowls of THC on a daily basis.    For 6-months prior to stopping her use of THC in 12/2021, she reported a pattern of smoking 1 bowl of THC 2-3 times per week.    The patient reported her longest period of abstinence from THC had been since 12/10/2021.    The patient reported she had stopped her use of THC secondary to being on court probation.   12/10/2021 None Smoke      Cocaine/Crack     No use          Meth/  Amphetamines   No use          Heroin     No use          Other Opiates/  Synthetics   No use          Inhalants     No use          Benzodiazepines     No use          Hallucinogens     No use          Barbiturates/  Sedatives/  Hypnotics No use          Over-the-Counter Drugs   No use          Other     No use           Nicotine     19 The patient reported being a former cigarette smoker and her last use of nicotine was during the summer of 2020.   Summer of 2020 None Smoke     The patient reported the following problems as a result of their substance use: DUI, family problems and legal issues.  The patient is not concerned about substance use.     The patient reports experiencing the following withdrawal symptoms within the past 12 months: none and the following within the past 30 days: none. (DSM-11)  The patient reported urges to use cannabis.  (DSM-4)  The patient reported she has not used more cannabis than intended and over a longer period of time than intended.  (DSM-1)  The patient reported she has not had unsuccessful attempts to cut down or control use of cannabis.  (DSM-2)  The patient reported her longest period of abstinence had been since 12/10/2021.  The patient reported she has not needed to use more cannabis to achieve the same effect.  (DSM-10)  The patient does not report diminished effect with use of same amount of cannabis.  (DSM-10)     The patient does not report a great deal of time is spent in activities necessary to obtain, use, or recover from cannabis effects.  (DSM-3)  The patient does not report important social, occupational, or recreational activities are given up or reduced because of cannabis use.  (DSM-7)  The patient denied having any persistent or recurrent physical or psychological problem that is likely to have been caused or exacerbated by use.   (DSM-9)  The patient reported the following problem behaviors while under the influence of substances: None.  (DSM-6)  The patient reported recurrent use of cannabis in physically hazardous such as driving a motor vehicle while under the influence.  (DSM-8)  The patient reported her recovery goal is: The patient's plan and goal is to continue to abstain from marijuana and from all other non-prescribed mood altering chemicals.     The patient  does not have other addictive behaviors she is concerned about at this time.  The patient reported her substance use has not negatively impacted her ability to function in a school setting within the past year.  (DSM-5)  The patient reported her substance use has not negatively impacted her ability to function in a work setting within the past year.  (DSM-5)  The patient's demographics and history impact her recovery in the following ways: The patient reported her father and her brother had a history of substance abuse.      Dimension Scale Ratings:    Dimension 1 -  Acute Intoxication/Withdrawal: 0 - No Problem    Dimension 2 - Biomedical: 0 - No Problem    Dimension 3 - Emotional/Behavioral/Cognitive Conditions: 1 - Minor Problem    Dimension 4 - Readiness to Change:  0 - No Problem    Dimension 5 - Relapse/Continued Use/ Continued Problem Potential: 1 - Minor Problem    Dimension 6 - Recovery Environment:  1 - Minor Problem    Significant Losses / Trauma / Abuse / Neglect Issues:   The patient did not serve in the .  There are indications or report of significant loss, trauma, abuse or neglect issues related to:  The patient denied having any history of being verbally, emotionally, physically or sexually abused.  The patient reported having a history of trauma issues due to death of her mother and 3 of her 4 grandparents.  The patient reported her mother had  suddenly and unexpectedly around 2 years ago from septic shock.  The patient denied having any history of suicide attempts and denied having any current suicide ideation.  The patient denied having any history of self-injurious behavior.     Concerns for possible neglect are not present.    Safety Assessment:   Patient denies current homicidal ideation and behaviors.  Patient denies current self-injurious ideation and behaviors.    Patient reported unsafe motor vehicle operation associated with substance use.  Patient denied any high risk  behaviors associated with mental health symptoms.  Patient reports the following current concerns for their personal safety: None.  Patient reports there are not firearms in the house.     History of Safety Concerns:  Patient denied a history of homicidal ideation.     Patient denied a history of personal safety concerns.    Patient denied a history of assaultive behaviors.    Patient denied a history of sexual assault behaviors.     Patient reported a history of unsafe motor vehicle operation associated with substance use.  Patient denies any history of high risk behaviors associated with mental health symptoms.  Patient reports the following protective factors: positive relationships positive family connections, forward/future oriented thinking, dedication to family/friends, safe and stable environment, adherence with prescribed medication, living with other people and commitment to well-being.    Risk Plan:  See Recommendations for Safety and Risk Management Plan    Review of Symptoms per patient report:  Substance Use:  driving under the influence.     Collateral Contact Summary:   Collateral contacts contributing to this assessment:  The patient's electronic medical records were reviewed at time of assessment.    This counselor talked with this patient's grandmother, Guille Sosa, on 5/10/2022 at 2:00 pm.  Guille stated she had never had any significant concerns regarding the patient having a substance abuse problem.  Guille stated she had been in pretty close contact with the patient ever since the patient's mother had passed away suddenly and unexpectedly around 2-years ago.  Guille stated she had been aware the patient had consumed alcohol and had smoked THC in the past, but to Guille's best awareness the patient has not had any use of THC or any other non-prescribed mood altering chemicals since 12/2021.    This counselor talked with this patient's boyfriend, Abdias Agosto, on 5/10/2022 at 2:50 pm.   Abdias stated he had been dating the patient for the past 7-8 months.  Abdias stated he had never had any significant concerns regarding the patient having a substance abuse problem and he is very supportive of her continuing to abstain from THC and from all other non-prescribed mood altering chemicals.  Abdias stated to his best awareness the patient had not had any use of THC or any other non-prescribed mood altering chemicals since 12/2021.    A voice message was left for [facility] on 5/11/2022 at 8:00 am instructing someone from [facility] to call this counselor back.  The purpose of the call was to obtain collateral information needed to complete the patient's substance use disorder assessment.  This counselor will await a return call from [facility].     If court related records were reviewed, summarize here: None    Information from collateral contacts supported/largely agreed with information from the client and associated risk ratings.    Information in this assessment was obtained from the medical record and provided by the patient who is a good historian.        The patient will have open access to her substance use disorder assessment medical record.    Diagnostic Criteria:   4.)  Craving, or a strong desire or urge to use the substance.  Met for:  cannabis.  8.)  Recurrent use of the substance in which it is physically hazardous.  Met for:  cannabis.    As evidenced by self report and criteria, the patient meets the following DSM-5 Diagnoses: (Sustained by DSM-5 Criteria Listed Above)      Cannabis Use Disorder Mild - 305.20 (F12.10)    Specify if: In early remission:  After full criteria for alcohol/drug use disorder were previously met, none of the criteria for alcohol/drug use disorder have been met for at least 3 months but for less than 12 months (with the exception that Criterion A4,  Craving or a strong desire or urge to use alcohol/drug  may be met).  "    In sustained remission:   After full criteria for alcohol use disorder were previously met, none of the criteria for alcohol/drug use disorder have been met at any time during a period of 12 months or longer (with the exception that Criterion A4,  Craving or strong desire or urge to use alcohol/drug  may be met).     Specify if:   This additional specifier is used if the individual is in an environment where access to alcohol is restricted.    Mild: Presence of 2-3 symptoms  Moderate: Presence of 4-5 symptoms  Severe: Presence of 6 or more symptoms    Recommendations:     1. Plan for Safety and Risk Management:     It was recommended the patient call 911 or go to the local Emergency Department should there be any significant change in the above risk factors.            Report to child / adult protection services was NA.    2. TERRIE Referrals:      Recommendations:      1.)  It was recommended that the patient refrain from drinking and driving at all times.  2.)  Follow all the terms and conditions of her court probation, included participating in alcohol and drug screening with court probation as directed.  3.)  Attend and complete a minimum of a Level I Driving with Care 12-hour DWI class.    4.)  In addition, if the patient chooses to continue to drink alcohol she should only drink alcohol in a minimal and social manner by not exceeding 3 \"standard\" alcoholic beverages (12 ounces of regular beer in the 5% alcohol range, 5 ounces of wine in the 12% alcohol range, or 1.5 ounces of distilled spirits in the 40% alcohol range) in any 24-hour period and by spreading her use of alcohol out over a long enough period of time given her gender and her body weight to avoid exceeding a 0.04 blood alcohol concentration.     Below is a list with programs which offer both the Level I and the Level II Driving with Care DWI classes.      The Level I Driving with Care class is a 12-hour DWI class.    The Level II Driving with Care " class is a 24-hour DWI class.      You have been recommended to attend the Level I Driving with Care 12-hour DWI class.    Program name Telephone number Website   [employer]    [phone] https://www.PageFair/     [facility] 869-639-1654 www.accessbehavioralchange.Spreetales/     [employer].   933.109.3011 www.SurfEasy.org/   [employer]. 638.298.8745 http://www.drivingwithcare.org/        [place] [facility] 949-718-6521 https://[facility].org/resources/driving-with-care/      [facility] [place] 287-545-5288 https://www.CYBERHAWK Innovations/driving-with-care-class/          There are additional programs which offer these Level I and Level II Driving with Care classes, so if none of the above programs work for you to attend a class, you can use an internet search to find additional programs which offer the Driving with Care classes.      It is your responsibility to set up a time to attend and complete the recommended level of the Driving with Care class and it is your responsibility to have the program you attend send a letter of completion of the Driving with Care class to your court , your court probation department and/or to your  as needed.      Rational for recommended level of care: The patient would benefit from increasing her awareness regarding the risks of substance abuse and from working on skills to minimize the potential for having future negative substance use consequences by attending a Level I Driving with Care 12-hour DWI class.    The patient reported she is willing to follow the above recommendations.     The patient would like the following family or other support people involved in their treatment:  None at this time.    The patient does not have a history of opiate use.    3.  Mental Health Referrals:     The patient did not appear to be in any need of a mental health referral at this  time.    4. Cultural Concerns:    The patient did not identify having any cultural concerns regarding mental health, physical health, or substance use issues.     5. Recommendations for treatment focus:      Alcohol / Substance Use - See #2. TERRIE Referrals above for details on recommendations.     Provider Name/ Credentials:  YONATHAN Singh  May 10, 2022

## 2022-05-11 NOTE — PROGRESS NOTES
"[facility]  [address] [place] [address]  5/11/2022    Angie Reynolds  [address]   [place] [address]      Angie,    This is to verify that Angie Reynolds (1995) participated in a substance use disorder assessment via telephone call with CITLALY Paulino/YONATHAN at [facility] in [place], [place] on 5/10/2022.    Recommendations:  1.)  It was recommended that the patient refrain from drinking and driving at all times.  2.)  Follow all the terms and conditions of her court probation, included participating in alcohol and drug screening with court probation as directed.  3.)  Attend and complete a minimum of a Level I Driving with Care 12-hour DWI class.    4.)  In addition, if the patient chooses to continue to drink alcohol she should only drink alcohol in a minimal and social manner by not exceeding 3 \"standard\" alcoholic beverages (12 ounces of regular beer in the 5% alcohol range, 5 ounces of wine in the 12% alcohol range, or 1.5 ounces of distilled spirits in the 40% alcohol range) in any 24-hour period and by spreading her use of alcohol out over a long enough period of time given her gender and her body weight to avoid exceeding a 0.04 blood alcohol concentration.     Below is a list with programs which offer both the Level I and the Level II Driving with Care DWI classes.      The Level I Driving with Care class is a 12-hour DWI class.    The Level II Driving with Care class is a 24-hour DWI class.      You have been recommended to attend the Level I Driving with Care 12-hour DWI class.    Program name Telephone number Website   [employer]    [phone] https://www.Ebrun.com/     [facility] 562-840-2507 www.accessbehavioralchange.com/     [employer]   509.361.6719 www.CrystalGenomics.org/   [employer]. 929.571.4545 http://www.BidAway.comwithcare.org/        [place] " [facility] Connection 355-116-3279 https://[facility].org/resources/driving-with-care/      [employer] -640-0542 https://www.Stitch.RichRelevance/driving-with-care-class/          There are additional programs which offer these Level I and Level II Driving with Care classes, so if none of the above programs work for you to attend a class, you can use an internet search to find additional programs which offer the Driving with Care classes.      It is your responsibility to set up a time to attend and complete the recommended level of the Driving with Care class and it is your responsibility to have the program you attend send a letter of completion of the Driving with Care class to your court , your court probation department and/or to your  as needed.      Rational for recommended level of care: The patient would benefit from increasing her awareness regarding the risks of substance abuse and from working on skills to minimize the potential for having future negative substance use consequences by attending a Level I Driving with Care 12-hour DWI class.    The patient reported she is willing to follow the above recommendations.     If you have any additional questions or concerns, please feel free to contact me by any of the contact methods listed below.    Sincerely,    CITLALY Conley, [facility]  CD Evaluation Counselor  [facility]  [address] [place] [id number]  [email or web address]  Tel: [phone]  Fax: [phone]

## 2022-05-11 NOTE — TELEPHONE ENCOUNTER
Left message for patient to call back. If patient calls back, please relay message below.    Please clarify why patient is scheduled with Dr. Gomez tomorrow 5/12/22. If patient is needing a letter for employer for proof of pregnancy, patient should be able to obtain that from her provider that is following her for OB care without a visit.

## 2022-05-12 NOTE — TELEPHONE ENCOUNTER
LMTCB. Please ask patient if she needs appointment scheduled today with Dr. Gomez.  Medical Opinion forms were sent to OB/GYN via [employer].

## 2022-07-27 NOTE — PROGRESS NOTES
Subjective: 27 year old, , 32w3d, presents for EOB visit. .     Denies cramping/contractions, vaginal bleeding, discharge or leakage of fluid. Reports + fetal movement.  No HA, vision changes, ruq/epigastric pain.      Patient concerns: Feeling well overall.  Has not been seen at [facility] since 15 weeks.  Had level 2 ultrasound- WNL.  - Treated last visit for genital warts- states she has not noticed them again.  - Will be moving on  to [place] to live with FOB mom and finding a place to live.   - Continues to have mid-lower back pain. Tried hot warm bath, seeing a chiropractor, and tylenol. Improving.       PHQ-9 SCORE 2021   PHQ-9 Total Score MyChart 6 (Mild depression)   PHQ-9 Total Score 6     Education completed today includes breast feeding, [facility] hand out, contraception, counting movements, signs of pre-term labor, when to present to birthplace, post partum depression, GBS, getting enough iron, labor induction, nitrous oxide, doulas and vitamin K.  Birth preferences reviewed: Medicated  Labor support:   and FOB mother   [place] Feeding plans :    Contraception planned:  condoms and natural family planning  The following labs were ordered today:       GCT, CBC w platelets, Vitamin D, Hep C and Anti-treponema  Water birth consent form was not given.  Blood type:   Antibody Screen   Date Value Ref Range Status   2022 Negative Negative Final   Rhogam  was not given.  TDAP  Was given.    A/P:  Encounter Diagnosis   Name Primary?     High-risk pregnancy Yes     Orders Placed This Encounter   Procedures     TDAP VACCINE (Adacel, Boostrix)  [[id number]]     Glucose 1 Hour     CBC with Platelets     Treponema Abs w Reflex to RPR and Titer     25- OH-Vitamin D       - Educated on starting antiviral suppression at 36 weeks since patient has history of genital warts.     Patient education/orders or handouts today:  PTL signs/symptoms, TDAP, Hospital tour and Childbirth  Ed classes    - EOB education reviewed.  - EOB labs ordered- 1 hour glucose, cbc with plts, anti-trep, vitamin D.  - Tdap given.  - Reviewed magnesium supplementation and other relief measures for restless leg syndrome.   - Discussed interventions for heartburn.     - Transferring care to [place].   Discussed to address the following with new provider:  - Recommendation from ACOG for weekly BPPs starting at 37 weeks d/t pre-pregnancy BMI >35.  - Consider growth ultrasound with upcoming visit if continued fundal height > dates.   - Hepatitis B nonimmune. Declined vaccine series at this time.     Continue scheduled prenatal care, RTC in 2 weeks and prn if questions or concerns.      I, Connor MARQUEZ, RN, TALIA GOMEZ Student, completed the PFSH and ROS. I then acted as a scribe for CNM for the remainder of the visit.     I agree with the PFSH and ROS as completed by Connor MARQUEZ, RN, TALIA TREADWELLNP Student, except for changes made by me. The remainder of the encounter was performed by me and scribed by the WHNP student. The scribed note accurately reflects my personal services and decisions made by me.    CARA [place] CNM The encounter was performed by me and scribed by the WHNP student. The scribed note accurately reflects my personal services and decisions made by me.     CARA [place] CNM

## 2022-07-28 ENCOUNTER — OFFICE VISIT (OUTPATIENT)
Dept: OBGYN | Facility: CLINIC | Age: 27
End: 2022-07-28
Attending: REGISTERED NURSE
Payer: COMMERCIAL

## 2022-07-28 ENCOUNTER — APPOINTMENT (OUTPATIENT)
Dept: LAB | Facility: CLINIC | Age: 27
End: 2022-07-28
Attending: REGISTERED NURSE
Payer: COMMERCIAL

## 2022-07-28 VITALS
BODY MASS INDEX: 45.3 KG/M2 | HEIGHT: 59 IN | WEIGHT: 224.7 LBS | SYSTOLIC BLOOD PRESSURE: 121 MMHG | HEART RATE: 84 BPM | DIASTOLIC BLOOD PRESSURE: 78 MMHG

## 2022-07-28 DIAGNOSIS — O09.90 HIGH-RISK PREGNANCY, UNSPECIFIED TRIMESTER: Primary | ICD-10-CM

## 2022-07-28 LAB
DEPRECATED CALCIDIOL+CALCIFEROL SERPL-MC: 36 UG/L (ref 20–75)
ERYTHROCYTE [DISTWIDTH] IN BLOOD BY AUTOMATED COUNT: 12.5 % (ref 10–15)
GLUCOSE 1H P 50 G GLC PO SERPL-MCNC: 155 MG/DL (ref 70–129)
HCT VFR BLD AUTO: 34.2 % (ref 35–47)
HGB BLD-MCNC: 11.7 G/DL (ref 11.7–15.7)
MCH RBC QN AUTO: 32.6 PG (ref 26.5–33)
MCHC RBC AUTO-ENTMCNC: 34.2 G/DL (ref 31.5–36.5)
MCV RBC AUTO: 95 FL (ref 78–100)
PLATELET # BLD AUTO: 307 10E3/UL (ref 150–450)
RBC # BLD AUTO: 3.59 10E6/UL (ref 3.8–5.2)
T PALLIDUM AB SER QL: NONREACTIVE
WBC # BLD AUTO: 11.7 10E3/UL (ref 4–11)

## 2022-07-28 PROCEDURE — 86780 TREPONEMA PALLIDUM: CPT | Performed by: ADVANCED PRACTICE MIDWIFE

## 2022-07-28 PROCEDURE — 36415 COLL VENOUS BLD VENIPUNCTURE: CPT | Performed by: ADVANCED PRACTICE MIDWIFE

## 2022-07-28 PROCEDURE — 85014 HEMATOCRIT: CPT | Performed by: ADVANCED PRACTICE MIDWIFE

## 2022-07-28 PROCEDURE — G0463 HOSPITAL OUTPT CLINIC VISIT: HCPCS

## 2022-07-28 PROCEDURE — 59425 ANTEPARTUM CARE ONLY: CPT | Performed by: ADVANCED PRACTICE MIDWIFE

## 2022-07-28 PROCEDURE — 90715 TDAP VACCINE 7 YRS/> IM: CPT

## 2022-07-28 PROCEDURE — 250N000011 HC RX IP 250 OP 636

## 2022-07-28 PROCEDURE — 90471 IMMUNIZATION ADMIN: CPT

## 2022-07-28 PROCEDURE — 82950 GLUCOSE TEST: CPT | Performed by: ADVANCED PRACTICE MIDWIFE

## 2022-07-28 PROCEDURE — 82306 VITAMIN D 25 HYDROXY: CPT | Performed by: ADVANCED PRACTICE MIDWIFE

## 2022-07-28 NOTE — LETTER
2022       RE: Angie Reynolds  [address]  [place] [address]     Dear Colleague,    Thank you for referring your patient, Angie [place], to the [facility] [place] [facility]. Please see a copy of my visit note below.    Subjective: 27 year old, , 32w3d, presents for EOB visit. .     Denies cramping/contractions, vaginal bleeding, discharge or leakage of fluid. Reports + fetal movement.  No HA, vision changes, ruq/epigastric pain.      Patient concerns: Feeling well overall.  Has not been seen at [facility] since 15 weeks.  Had level 2 ultrasound- WNL.  - Treated last visit for genital warts- states she has not noticed them again.  - Will be moving on  to [place] to live with FOB mom and finding a place to live.   - Continues to have mid-lower back pain. Tried hot warm bath, seeing a chiropractor, and tylenol. Improving.       PHQ-9 SCORE 2021   PHQ-9 Total Score MyChart 6 (Mild depression)   PHQ-9 Total Score 6     Education completed today includes breast feeding, [facility] hand out, contraception, counting movements, signs of pre-term labor, when to present to birthplace, post partum depression, GBS, getting enough iron, labor induction, nitrous oxide, doulas and vitamin K.  Birth preferences reviewed: Medicated  Labor support:   and FOB mother   [place] Feeding plans :    Contraception planned:  condoms and natural family planning  The following labs were ordered today:       GCT, CBC w platelets, Vitamin D, Hep C and Anti-treponema  Water birth consent form was not given.  Blood type:   Antibody Screen   Date Value Ref Range Status   2022 Negative Negative Final   Rhogam  was not given.  TDAP  Was given.    A/P:  Encounter Diagnosis   Name Primary?     High-risk pregnancy Yes     Orders Placed This Encounter   Procedures     TDAP VACCINE (Adacel, Boostrix)   [[id number]]     Glucose 1 Hour     CBC with Platelets     Treponema Abs w Reflex to RPR and Titer     25- OH-Vitamin D       - Educated on starting antiviral suppression at 36 weeks since patient has history of genital warts.     Patient education/orders or handouts today:  PTL signs/symptoms, TDAP, Hospital tour and Childbirth Ed classes    - EOB education reviewed.  - EOB labs ordered- 1 hour glucose, cbc with plts, anti-trep, vitamin D.  - Tdap given.  - Reviewed magnesium supplementation and other relief measures for restless leg syndrome.   - Discussed interventions for heartburn.     - Transferring care to [place].   Discussed to address the following with new provider:  - Recommendation from ACOG for weekly BPPs starting at 37 weeks d/t pre-pregnancy BMI >35.  - Consider growth ultrasound with upcoming visit if continued fundal height > dates.   - Hepatitis B nonimmune. Declined vaccine series at this time.     Continue scheduled prenatal care, RTC in 2 weeks and prn if questions or concerns.      I, Connor MARQUEZ, RN, TALIA GOMEZ Student, completed the PFSH and ROS. I then acted as a scribe for CNM for the remainder of the visit.     I agree with the PFSH and ROS as completed by Connor MARQUEZ, RN, TALIA GOMEZ Student, except for changes made by me. The remainder of the encounter was performed by me and scribed by the WHNP student. The scribed note accurately reflects my personal services and decisions made by me.    CARA [place] CNM The encounter was performed by me and scribed by the WHNP student. The scribed note accurately reflects my personal services and decisions made by me.     CARA [place] CNM

## 2022-08-01 DIAGNOSIS — O09.90 HIGH-RISK PREGNANCY, UNSPECIFIED TRIMESTER: Primary | ICD-10-CM

## 2022-08-01 DIAGNOSIS — R73.09 GTT (GLUCOSE TOLERANCE TEST) ABNORMAL: ICD-10-CM

## 2022-08-02 ENCOUNTER — LAB (OUTPATIENT)
Dept: LAB | Facility: CLINIC | Age: 27
End: 2022-08-02
Payer: COMMERCIAL

## 2022-08-02 DIAGNOSIS — R73.09 GTT (GLUCOSE TOLERANCE TEST) ABNORMAL: ICD-10-CM

## 2022-08-02 LAB
GESTATIONAL GTT 1 HR POST DOSE: 162 MG/DL (ref 60–179)
GESTATIONAL GTT 2 HR POST DOSE: 124 MG/DL (ref 60–154)
GESTATIONAL GTT 3 HR POST DOSE: 118 MG/DL (ref 60–139)
GLUCOSE P FAST SERPL-MCNC: 94 MG/DL (ref 60–94)

## 2022-08-02 PROCEDURE — 82947 ASSAY GLUCOSE BLOOD QUANT: CPT

## 2022-08-02 PROCEDURE — 36415 COLL VENOUS BLD VENIPUNCTURE: CPT

## 2022-08-02 PROCEDURE — 82950 GLUCOSE TEST: CPT

## 2022-08-02 PROCEDURE — 82951 GLUCOSE TOLERANCE TEST (GTT): CPT

## 2023-01-08 ENCOUNTER — HEALTH MAINTENANCE LETTER (OUTPATIENT)
Age: 28
End: 2023-01-08

## 2023-05-19 ENCOUNTER — NURSE TRIAGE (OUTPATIENT)
Dept: NURSING | Facility: CLINIC | Age: 28
End: 2023-05-19
Payer: COMMERCIAL

## 2023-05-20 NOTE — TELEPHONE ENCOUNTER
"Angie calls and says that she had a  7 months ago and has upper abdominal pain. Pain = 5/10. Pt. Says that her middle abdomen is sore, above her incision.    Reason for Disposition    [1] MODERATE pain (e.g., interferes with normal activities) AND [2] comes and goes (cramps) AND [3] present > 24 hours  (Exception: pain with Vomiting or Diarrhea - see that Guideline)    Additional Information    Negative: SEVERE difficulty breathing (e.g., struggling for each breath, speaks in single words)    Negative: Shock suspected (e.g., cold/pale/clammy skin, too weak to stand, low BP, rapid pulse)    Negative: Difficult to awaken or acting confused (e.g., disoriented, slurred speech)    Negative: Passed out (i.e., lost consciousness, collapsed and was not responding)    Negative: Visible sweat on face or sweat dripping down face    Negative: Sounds like a life-threatening emergency to the triager    Negative: Followed an abdomen (stomach) injury    Negative: Chest pain    Negative: [1] SEVERE pain (e.g., excruciating) AND [2] present > 1 hour    Negative: [1] Pain lasts > 10 minutes AND [2] age > 50    Negative: [1] Pain lasts > 10 minutes AND [2] age > 40 AND [3] associated chest, arm, neck, upper back or jaw pain    Negative: [1] Pain lasts > 10 minutes AND [2] age > 35 AND [3] at least one cardiac risk factor (i.e., hypertension, diabetes, obesity, smoker or strong family history of heart disease)    Negative: [1] Pain lasts > 10 minutes AND [2] history of heart disease (i.e., heart attack, bypass surgery, angina, angioplasty, CHF; not just a heart murmur)    Negative: [1] Pain lasts > 10 minutes AND [2] difficulty breathing    Negative: [1] Vomiting AND [2] contains red blood  (Exception: few streaks and only occurred once)    Negative: [1] Vomiting AND [2] contains black (\"coffee ground\") material    Negative: Blood in bowel movements  (Exception: Blood on surface of BM with constipation)    Negative: Black or " tarry bowel movements (Exception: chronic-unchanged black-grey bowel movements AND is taking iron pills or Pepto-bismol)    Negative: [1] Pregnant 20 or more weeks AND [2] new hand or face swelling    Negative: Patient sounds very sick or weak to the triager    Negative: [1] MILD-MODERATE pain AND [2] constant AND [3] present > 2 hours    Negative: [1] MILD-MODERATE pain AND [2] not relieved by antacids    Negative: [1] Vomiting AND [2] contains bile (green color)    Negative: [1] Vomiting AND [2] abdomen looks much more swollen than usual    Negative: White of the eyes have turned yellow (i.e., jaundice)    Negative: Fever > 103 F (39.4 C)    Negative: [1] Fever > 101 F (38.3 C) AND [2] age > 60 years    Negative: [1] Fever > 100.0 F (37.8 C) AND [2] bedridden (e.g., nursing home patient, CVA, chronic illness, recovering from surgery)    Negative: [1] Fever > 100.0 F (37.8 C) AND [2] diabetes mellitus or weak immune system (e.g., HIV positive, cancer chemo, splenectomy, organ transplant, chronic steroids)    Protocols used: ABDOMINAL PAIN - UPPER-A-AH

## 2023-10-27 ENCOUNTER — NURSE TRIAGE (OUTPATIENT)
Dept: PEDIATRICS | Facility: CLINIC | Age: 28
End: 2023-10-27
Payer: COMMERCIAL

## 2023-10-27 NOTE — TELEPHONE ENCOUNTER
CARE ADVICE:  BE SEEN TODAY    No avaliable appointments.    RN advised patient be seen in Urgent care.    Patient verbalized understanding.    Patient states she has been ill since last Saturday.    Patient vomited most of the day on Wednesday.    Patient now congested and her chest feels heavy.  No chest Pain.    Patient has cough and is coughing up sputum.  Sputum now yellow.    RN advised patient to be seen today.    Patient verbalized understanding.    Reason for Disposition   Change in color of sputum (e.g., from white to yellow-green sputum)    Additional Information   Negative: SEVERE difficulty breathing (e.g., struggling for each breath, speaks in single words)   Negative: Lips or face are bluish now and persists when not coughing   Negative: Sounds like a life-threatening emergency to the triager   Negative: Chest pain is main symptom   Negative: Cough and < 3 weeks duration   Negative: Previous asthma attacks and this feels like an asthma attack   Negative: MODERATE difficulty breathing (e.g., speaks in phrases, SOB even at rest, pulse 100-120) and still present when not coughing   Negative: Chest pain  (Exception: MILD central chest pain, present only when coughing.)   Negative: Increasing difficulty breathing and always has some difficulty breathing   Negative: Patient sounds very sick or weak to the triager   Negative: MILD difficulty breathing (e.g., minimal/no SOB at rest, SOB with walking, pulse < 100) and still present when not coughing  (Exception: No change from usual, chronic shortness of breath.)   Negative: Coughed up blood and > 1 tablespoon (15 ml)  (Exception: Blood-tinged sputum.)   Negative: Fever > 103 F (39.4 C)   Negative: Fever > 101 F (38.3 C) and age > 60 years   Negative: Fever > 100.0 F (37.8 C) and bedridden (e.g., CVA, chronic illness, recovering from surgery)   Negative: Fever > 100.0 F (37.8 C) and diabetes mellitus or weak immune system (e.g., HIV positive, cancer chemo,  splenectomy, organ transplant, chronic steroids)    Answer Assessment - Initial Assessment Questions  See Documentation    Protocols used: Cough - Chronic-A-OH      Arsen Monterroso, RN, BSN, PHN  [employer]

## 2024-01-16 ENCOUNTER — OFFICE VISIT (OUTPATIENT)
Dept: FAMILY MEDICINE | Facility: CLINIC | Age: 29
End: 2024-01-16
Payer: COMMERCIAL

## 2024-01-16 VITALS
WEIGHT: 203 LBS | SYSTOLIC BLOOD PRESSURE: 119 MMHG | OXYGEN SATURATION: 97 % | HEART RATE: 76 BPM | DIASTOLIC BLOOD PRESSURE: 74 MMHG | BODY MASS INDEX: 42.61 KG/M2 | HEIGHT: 58 IN | TEMPERATURE: 97.3 F | RESPIRATION RATE: 16 BRPM

## 2024-01-16 DIAGNOSIS — R45.86 MOOD SWINGS: ICD-10-CM

## 2024-01-16 DIAGNOSIS — L91.8 SKIN TAG: Primary | ICD-10-CM

## 2024-01-16 DIAGNOSIS — F41.1 GAD (GENERALIZED ANXIETY DISORDER): ICD-10-CM

## 2024-01-16 DIAGNOSIS — K64.4 EXTERNAL HEMORRHOIDS: ICD-10-CM

## 2024-01-16 PROCEDURE — 96127 BRIEF EMOTIONAL/BEHAV ASSMT: CPT | Performed by: FAMILY MEDICINE

## 2024-01-16 PROCEDURE — 99213 OFFICE O/P EST LOW 20 MIN: CPT | Performed by: FAMILY MEDICINE

## 2024-01-16 RX ORDER — HYDROCORTISONE 25 MG/G
CREAM TOPICAL 2 TIMES DAILY PRN
Qty: 30 G | Refills: 0 | Status: SHIPPED | OUTPATIENT
Start: 2024-01-16

## 2024-01-16 ASSESSMENT — PATIENT HEALTH QUESTIONNAIRE - PHQ9
SUM OF ALL RESPONSES TO PHQ QUESTIONS 1-9: 9
10. IF YOU CHECKED OFF ANY PROBLEMS, HOW DIFFICULT HAVE THESE PROBLEMS MADE IT FOR YOU TO DO YOUR WORK, TAKE CARE OF THINGS AT HOME, OR GET ALONG WITH OTHER PEOPLE: SOMEWHAT DIFFICULT
SUM OF ALL RESPONSES TO PHQ QUESTIONS 1-9: 9

## 2024-01-16 ASSESSMENT — PAIN SCALES - GENERAL: PAINLEVEL: MODERATE PAIN (4)

## 2024-01-16 NOTE — PROGRESS NOTES
"  Assessment & Plan     Skin tag  ( Exam was done with Female staff in the room )  - Adult Dermatology  Referral; Future  She has one large skin tag close to her private.  Because of the location, the size, I referred to dermatology    PARRIS (generalized anxiety disorder)  - Adult Mental Health  Referral; Future  Mood swings  She denies depression, denies suicidal thoughts or ideation.  She was referred for evaluation, diagnosis and management    She declined to start medications now, she prefer to be seen and diagnosed first.    External hemorrhoids  Increase fiber and water intake,   Avoid constipation.  - hydrocortisone, Perianal, (HYDROCORTISONE) 2.5 % cream; Place rectally 2 times daily as needed for hemorrhoids    She was concerned of varicose vein.  However, exam was benign, there was no varicose or spider veins, there was no swelling noted.     BMI:   Estimated body mass index is 42.61 kg/m  as calculated from the following:    Height as of this encounter: 1.47 m (4' 9.87\").    Weight as of this encounter: 92.1 kg (203 lb).   Weight management plan: Discussed healthy diet and exercise guidelines    Depression Screening Follow Up        1/16/2024     4:30 PM   PHQ   PHQ-9 Total Score 9   Q9: Thoughts of better off dead/self-harm past 2 weeks Several days   F/U: Thoughts of suicide or self-harm No   F/U: Safety concerns No     Subjective   Angie is a 28 year old, presenting for the following health issues:  Skin Tags and Spider Veins/legs    Patient reports she has been having his skin tag between her legs, close to her private area causing irritation when she walks.    History of constipation, history of hemorrhoid, external hemorrhoid.  Denies pain, no bleeding.    Patient reports s mood swings where she becomes sad, sometimes agitated,  Denies depression, denies panic attack.    Denies alcohol abuse or drugs abuse.  She never been diagnosed with bipolar or any other mental " "illnesses.          1/16/2024     4:43 PM   Additional Questions   Roomed by [place] GOTTLIEB CMA   Accompanied by Self         1/16/2024     4:43 PM   Patient Reported Additional Medications   Patient reports taking the following new medications None       History of Present Illness       Reason for visit:  Check up (Skin tags)  Symptom onset:  More than a month (for some years)  Symptom intensity:  Moderate  Symptom progression:  Worsening (increased in sized)    She eats 2-3 servings of fruits and vegetables daily.She consumes 3 sweetened beverage(s) daily.She exercises with enough effort to increase her heart rate 9 or less minutes per day.  She exercises with enough effort to increase her heart rate 3 or less days per week.   She is taking medications regularly.       Review of Systems   Constitutional, HEENT, cardiovascular, pulmonary, GI, , musculoskeletal, neuro, skin, endocrine and psych systems are negative, except as otherwise noted.      Objective    /74 (BP Location: Right arm, Patient Position: Chair, Cuff Size: Adult Large)   Pulse 76   Temp 97.3  F (36.3  C) (Oral)   Resp 16   Ht 1.47 m (4' 9.87\")   Wt 92.1 kg (203 lb)   LMP 12/13/2023   SpO2 97%   Breastfeeding No   BMI 42.61 kg/m    Body mass index is 42.61 kg/m .  Physical Exam   Exam was done with Female Staff in the room  GENERAL: healthy, alert and no distress  ABDOMEN: soft, nontender, no hepatosplenomegaly, no masses and bowel sounds normal  MS: no gross musculoskeletal defects noted, no edema  SKIN: skin tag, between upper leg area.  NEURO: Normal strength and tone, mentation intact and speech normal  PSYCH: mentation appears normal, affect normal/bright    Orders Placed This Encounter   Procedures    REVIEW OF HEALTH MAINTENANCE PROTOCOL ORDERS    Adult Mental Health  Referral    Adult Dermatology  Referral            Xochitl Narvaez MD              "

## 2024-02-05 ENCOUNTER — OFFICE VISIT (OUTPATIENT)
Dept: FAMILY MEDICINE | Facility: CLINIC | Age: 29
End: 2024-02-05
Payer: COMMERCIAL

## 2024-02-05 ENCOUNTER — NURSE TRIAGE (OUTPATIENT)
Dept: PEDIATRICS | Facility: CLINIC | Age: 29
End: 2024-02-05

## 2024-02-05 VITALS
BODY MASS INDEX: 42.49 KG/M2 | HEART RATE: 114 BPM | SYSTOLIC BLOOD PRESSURE: 114 MMHG | RESPIRATION RATE: 16 BRPM | DIASTOLIC BLOOD PRESSURE: 68 MMHG | HEIGHT: 58 IN | WEIGHT: 202.4 LBS | OXYGEN SATURATION: 96 % | TEMPERATURE: 97.6 F

## 2024-02-05 DIAGNOSIS — L91.8 SKIN TAG: Primary | ICD-10-CM

## 2024-02-05 PROCEDURE — 11200 RMVL SKIN TAGS UP TO&INC 15: CPT | Performed by: PHYSICIAN ASSISTANT

## 2024-02-05 ASSESSMENT — ENCOUNTER SYMPTOMS
WOUND: 0
FEVER: 0
ROS SKIN COMMENTS: SKIN TAG

## 2024-02-05 NOTE — TELEPHONE ENCOUNTER
Pt calling to state that skin tag located in inner right thigh that is now bleeding. Pt states that the skin tag may have gotten caught on something at 10:30am and started bleeding. Blood soaked dime size amount on napkin. Applied pressure with no relief. Pain 5/10 pain.     RN informed pt that she should be evaluated now for bleeding that has not stopped for greater than 30mins. Pt asked if skin tag will be removed in ED. RN informed pt that the provider will assess situation and consider options for care.     Pt states that bleeding has stopped and slight blood present and requesting later appointment with an available provider. Rn scheduled pt with available provider 2/5/24 per pt request.    Eileen Valdez RN on 2/5/2024 at 11:14 AM      Reason for Disposition   Patient wants to be seen    Additional Information   Negative: Major bleeding (actively dripping or spurting) that can't be stopped   Negative: Amputation   Negative: Shock suspected (e.g., cold/pale/clammy skin, too weak to stand, low BP, rapid pulse)   Negative: Knife wound (or other possibly deep cut) to chest, abdomen, back, neck, or head   Negative: Sounds like a life-threatening emergency to the triager   Negative: Animal bite   Negative: Burn   Negative: Foreign body in skin (e.g., splinter, sliver)   Negative: Human bite   Negative: Puncture wound   Negative: Wound infection suspected (cut or other wound now looks infected)   Negative: High pressure injection injury (e.g., from grease gun or paint gun, usually work-related)   Negative: Skin loss goes very deep (e.g., can see bones or tendons)   Negative: Skin loss involves more than 10% of body surface area   Negative: Skin is split open or gaping (length > 1/2 inch or 12 mm on the skin, 1/4 inch or 6 mm on the face)   Negative: Bleeding won't stop after 10 minutes of direct pressure (using correct technique)   Negative: Deep cut and can see bone or tendons   Negative: Dirt in the wound and not  "removed after 15 minutes of scrubbing   Negative: Wound causes numbness (i.e., loss of sensation)   Negative: Wound causes weakness (i.e., decreased ability to move hand, finger, toe)   Negative: Sounds like a serious injury to the triager   Negative: SEVERE pain (e.g., excruciating)   Negative: Looks infected (spreading redness, red streak, pus) and fever   Negative: Looks infected and large red area (> 2 inches or 5 cm)   Negative: Raised bruise with size > 2 inches (5 cm) that is getting bigger   Negative: Looks infected (spreading redness, pus) and no fever   Negative: No prior tetanus shots (or is not fully vaccinated) and any wound (e.g., cut or scrape)   Negative: HIV positive or severe immunodeficiency (severely weak immune system) and DIRTY cut or scrape    Answer Assessment - Initial Assessment Questions  1. APPEARANCE of INJURY: \"What does the injury look like?\"       Skin tag tear  2. SIZE: \"How large is the cut?\"       Small cut, pt unable to see it.   3. BLEEDING: \"Is it bleeding now?\" If Yes, ask: \"Is it difficult to stop?\"       Bleeding   4. LOCATION: \"Where is the injury located?\"       Inner right thigh on underwear line   5. ONSET: \"How long ago did the injury occur?\"       10:30am 2/5/24  6. MECHANISM: \"Tell me how it happened.\"       Caught on something   7. TETANUS: \"When was the last tetanus booster?\"      Unknown   8. PREGNANCY: \"Is there any chance you are pregnant?\" \"When was your last menstrual period?\"      no    Protocols used: Skin Injury-A-[place]    "

## 2024-02-05 NOTE — PROGRESS NOTES
Assessment & Plan     Skin tag  Patient is a 28-year-old female who presents to clinic due to skin tag concerns.  Skin tag has been present for many years, but has recently started catching on clothing causing discomfort and intermittent bleeding.  Patient would like to have skin tag removed.  Discussed procedure and risks as well as benefits.  Patient elects to proceed.  Skin tag removed successfully and bleeding well-controlled.  Recommended leaving dressing in place for 24 hours to prevent rebleeding.  Discussed ongoing skin care and dressing application if needed.  Return precautions provided.  - REMOVAL OF SKIN TAGS, FIRST 15      See Patient Instructions    Subjective   Angie is a 28 year old, presenting for the following health issues:  Derm Problem        2/5/2024     2:23 PM   Additional Questions   Roomed by Westley     History of Present Illness       Reason for visit:  Skin tag    She eats 2-3 servings of fruits and vegetables daily.She consumes 2 sweetened beverage(s) daily.She exercises with enough effort to increase her heart rate 9 or less minutes per day.  She exercises with enough effort to increase her heart rate 3 or less days per week.   She is taking medications regularly.     Patient notes she has had a skin tag at the inner aspect of right thigh/buttock that has been present since high school.  Patient notes over the last few years the skin tag started scraping on things such as clothing at which time it bleeds and becomes painful.  She would like it removed.  She did schedule with dermatology, but notes her appointment is not until July.    Per chart review, patient evaluated by [facility] 1/16/2024 and noted to have large skin tag at upper thigh.  Because of location and size, patient was referred to dermatology.    Per nurse triage note, patient called due to bleeding from the skin tag and patient noted it may have gotten caught in something.  Pressure was applied with no relief  "in bleeding.  Due to bleeding, patient scheduled for follow-up today.    Patient has dermatology follow-up scheduled 7/22/2024.    Review of Systems   Constitutional:  Negative for fever.   Skin:  Negative for rash and wound.        Skin tag             Objective    /68   Pulse 114   Temp 97.6  F (36.4  C)   Resp 16   Ht 1.47 m (4' 9.87\")   Wt 91.8 kg (202 lb 6.4 oz)   LMP 12/18/2023   SpO2 96%   Breastfeeding No   BMI 42.49 kg/m    Body mass index is 42.49 kg/m .  Physical Exam  Vitals and nursing note reviewed.   Constitutional:       General: She is not in acute distress.     Appearance: Normal appearance.   HENT:      Head: Normocephalic and atraumatic.   Eyes:      Extraocular Movements: Extraocular movements intact.      Pupils: Pupils are equal, round, and reactive to light.   Cardiovascular:      Rate and Rhythm: Normal rate.   Pulmonary:      Effort: Pulmonary effort is normal.   Musculoskeletal:         General: Normal range of motion.      Cervical back: Normal range of motion.   Skin:     General: Skin is warm and dry.      Comments: 0.3cm flesh toned, nontender, pedunculated lesion on narrow stalk without surrounding swelling, induration, fluctuance.   Neurological:      General: No focal deficit present.      Mental Status: She is alert.   Psychiatric:         Mood and Affect: Mood normal.         Behavior: Behavior normal.            Skin tag removal:  Skin tag area clean successfully with Iodine.  Anesthesia performed successfully with 0.5 mL lidocaine with epinephrine.  Skin tags removed with forceps and derma blade.  Mild bleeding.  Bleeding controlled with small amount of silver nitrate and subsequently Drysol.  Bacitracin and dressings applied.  Patient tolerated procedure well.           Signed Electronically by: Sera Vargas PA-C    "

## 2024-02-05 NOTE — PATIENT INSTRUCTIONS
The skin tag at your right buttock was removed successfully in clinic today.  Please leave the dressing in place for 24 hours so that it can scab over well and this will help prevent rebleeding.  After you remove the dressing, you can shower like normal, but do not rub the area harshly.  You can reapply the dressing if any bleeding occurs using the antibiotic ointment on the gauze with a Band-Aid tight over the top.  Please reach out with any questions or concerns.  Follow-up in clinic for new or worsening symptoms.

## 2024-02-11 ENCOUNTER — HEALTH MAINTENANCE LETTER (OUTPATIENT)
Age: 29
End: 2024-02-11

## 2024-07-31 ENCOUNTER — TELEPHONE (OUTPATIENT)
Dept: FAMILY MEDICINE | Facility: CLINIC | Age: 29
End: 2024-07-31
Payer: COMMERCIAL

## 2024-07-31 NOTE — TELEPHONE ENCOUNTER
Patient Quality Outreach    Patient is due for the following:   Cervical Cancer Screening - PAP Needed  Physical Preventive Adult Physical      Topic Date Due    HPV Vaccine (2 - 3-dose series) 03/29/2019    COVID-19 Vaccine (1 - 2023-24 season) Never done       Next Steps:   Schedule a Adult Preventative    Type of outreach:    Sent [employer] message.      Questions for provider review:    None           Falguni Rodriguez CMA  Chart routed to Care Team.

## 2025-03-08 ENCOUNTER — HEALTH MAINTENANCE LETTER (OUTPATIENT)
Age: 30
End: 2025-03-08